# Patient Record
Sex: MALE | Race: WHITE | NOT HISPANIC OR LATINO | Employment: OTHER | URBAN - METROPOLITAN AREA
[De-identification: names, ages, dates, MRNs, and addresses within clinical notes are randomized per-mention and may not be internally consistent; named-entity substitution may affect disease eponyms.]

---

## 2023-01-31 PROBLEM — M10.9 GOUT: Status: ACTIVE | Noted: 2023-01-31

## 2023-01-31 PROBLEM — S76.812A STRAIN OF LEFT ILIOPSOAS MUSCLE: Status: ACTIVE | Noted: 2023-01-31

## 2023-01-31 PROBLEM — I10 HYPERTENSION: Status: ACTIVE | Noted: 2023-01-31

## 2023-01-31 PROBLEM — R50.9 SUBJECTIVE FEVER: Status: RESOLVED | Noted: 2023-01-31 | Resolved: 2023-01-31

## 2023-01-31 PROBLEM — R19.7 DIARRHEA: Status: RESOLVED | Noted: 2023-01-31 | Resolved: 2023-01-31

## 2023-01-31 PROBLEM — N40.0 BPH (BENIGN PROSTATIC HYPERPLASIA): Status: ACTIVE | Noted: 2023-01-31

## 2023-01-31 PROBLEM — M25.552 PAIN OF LEFT HIP JOINT: Status: ACTIVE | Noted: 2023-01-31

## 2023-01-31 PROBLEM — R52 BODY ACHES: Status: RESOLVED | Noted: 2023-01-31 | Resolved: 2023-01-31

## 2023-01-31 PROBLEM — M54.9 BACK PAIN: Status: ACTIVE | Noted: 2023-01-31

## 2023-01-31 PROBLEM — R97.20 ELEVATED PSA: Status: ACTIVE | Noted: 2023-01-31

## 2023-01-31 PROBLEM — E78.5 DYSLIPIDEMIA: Status: ACTIVE | Noted: 2023-01-31

## 2023-01-31 PROBLEM — R09.81 SINUS CONGESTION: Status: RESOLVED | Noted: 2023-01-31 | Resolved: 2023-01-31

## 2023-01-31 RX ORDER — MULTIVITAMIN
1 TABLET ORAL DAILY
COMMUNITY

## 2023-01-31 RX ORDER — ALLOPURINOL 300 MG/1
1 TABLET ORAL EVERY OTHER DAY
COMMUNITY
End: 2023-04-03 | Stop reason: SDUPTHER

## 2023-01-31 RX ORDER — ACETAMINOPHEN 500 MG
2 TABLET ORAL DAILY
COMMUNITY
Start: 2022-04-01

## 2023-01-31 RX ORDER — MECLIZINE HYDROCHLORIDE 25 MG/1
25 TABLET ORAL EVERY 6 HOURS
COMMUNITY
Start: 2021-07-12

## 2023-01-31 RX ORDER — METOPROLOL TARTRATE 50 MG/1
50 TABLET ORAL DAILY
COMMUNITY
End: 2024-01-26 | Stop reason: WASHOUT

## 2023-01-31 RX ORDER — TAMSULOSIN HYDROCHLORIDE 0.4 MG/1
0.4 CAPSULE ORAL DAILY
COMMUNITY
Start: 2021-03-11 | End: 2023-04-03 | Stop reason: SDUPTHER

## 2023-01-31 RX ORDER — ACETAMINOPHEN 500 MG
1000 TABLET ORAL
COMMUNITY
Start: 2022-04-01

## 2023-01-31 RX ORDER — L. ACIDOPHILUS/L.BULGARICUS 1MM CELL
1 TABLET ORAL DAILY
COMMUNITY
Start: 2021-11-19

## 2023-01-31 RX ORDER — METHYLDOPA/HYDROCHLOROTHIAZIDE 250MG-15MG
1 TABLET ORAL DAILY
COMMUNITY
Start: 2022-04-01

## 2023-03-23 ENCOUNTER — APPOINTMENT (OUTPATIENT)
Dept: PRIMARY CARE | Facility: CLINIC | Age: 78
End: 2023-03-23
Payer: MEDICARE

## 2023-03-29 LAB
ALANINE AMINOTRANSFERASE (SGPT) (U/L) IN SER/PLAS: 31 U/L (ref 10–52)
ALBUMIN (G/DL) IN SER/PLAS: 4.6 G/DL (ref 3.4–5)
ALKALINE PHOSPHATASE (U/L) IN SER/PLAS: 55 U/L (ref 33–136)
ANION GAP IN SER/PLAS: 10 MMOL/L (ref 10–20)
ASPARTATE AMINOTRANSFERASE (SGOT) (U/L) IN SER/PLAS: 21 U/L (ref 9–39)
BILIRUBIN TOTAL (MG/DL) IN SER/PLAS: 1 MG/DL (ref 0–1.2)
CALCIUM (MG/DL) IN SER/PLAS: 9.9 MG/DL (ref 8.6–10.3)
CARBON DIOXIDE, TOTAL (MMOL/L) IN SER/PLAS: 29 MMOL/L (ref 21–32)
CHLORIDE (MMOL/L) IN SER/PLAS: 105 MMOL/L (ref 98–107)
CHOLESTEROL (MG/DL) IN SER/PLAS: 90 MG/DL (ref 0–199)
CHOLESTEROL IN HDL (MG/DL) IN SER/PLAS: 39.7 MG/DL
CHOLESTEROL/HDL RATIO: 2.3
CREATININE (MG/DL) IN SER/PLAS: 0.95 MG/DL (ref 0.5–1.3)
GFR MALE: 81 ML/MIN/1.73M2
GLUCOSE (MG/DL) IN SER/PLAS: 87 MG/DL (ref 74–99)
LDL: 31 MG/DL (ref 0–99)
POTASSIUM (MMOL/L) IN SER/PLAS: 4.1 MMOL/L (ref 3.5–5.3)
PROTEIN TOTAL: 7.5 G/DL (ref 6.4–8.2)
SODIUM (MMOL/L) IN SER/PLAS: 140 MMOL/L (ref 136–145)
TRIGLYCERIDE (MG/DL) IN SER/PLAS: 98 MG/DL (ref 0–149)
URATE (MG/DL) IN SER/PLAS: 6 MG/DL (ref 4–7.5)
UREA NITROGEN (MG/DL) IN SER/PLAS: 18 MG/DL (ref 6–23)
VLDL: 20 MG/DL (ref 0–40)

## 2023-04-03 ENCOUNTER — OFFICE VISIT (OUTPATIENT)
Dept: PRIMARY CARE | Facility: CLINIC | Age: 78
End: 2023-04-03
Payer: MEDICARE

## 2023-04-03 VITALS
DIASTOLIC BLOOD PRESSURE: 70 MMHG | WEIGHT: 169 LBS | HEIGHT: 68 IN | BODY MASS INDEX: 25.61 KG/M2 | HEART RATE: 55 BPM | SYSTOLIC BLOOD PRESSURE: 130 MMHG | RESPIRATION RATE: 16 BRPM | OXYGEN SATURATION: 95 %

## 2023-04-03 DIAGNOSIS — Z00.00 ROUTINE GENERAL MEDICAL EXAMINATION AT HEALTH CARE FACILITY: Primary | ICD-10-CM

## 2023-04-03 DIAGNOSIS — N40.0 BENIGN PROSTATIC HYPERPLASIA WITHOUT LOWER URINARY TRACT SYMPTOMS: ICD-10-CM

## 2023-04-03 DIAGNOSIS — I10 PRIMARY HYPERTENSION: ICD-10-CM

## 2023-04-03 DIAGNOSIS — M1A.9XX0 CHRONIC GOUT WITHOUT TOPHUS, UNSPECIFIED CAUSE, UNSPECIFIED SITE: ICD-10-CM

## 2023-04-03 DIAGNOSIS — E78.5 DYSLIPIDEMIA: ICD-10-CM

## 2023-04-03 PROCEDURE — G0438 PPPS, INITIAL VISIT: HCPCS | Performed by: STUDENT IN AN ORGANIZED HEALTH CARE EDUCATION/TRAINING PROGRAM

## 2023-04-03 PROCEDURE — 1159F MED LIST DOCD IN RCRD: CPT | Performed by: STUDENT IN AN ORGANIZED HEALTH CARE EDUCATION/TRAINING PROGRAM

## 2023-04-03 PROCEDURE — 1160F RVW MEDS BY RX/DR IN RCRD: CPT | Performed by: STUDENT IN AN ORGANIZED HEALTH CARE EDUCATION/TRAINING PROGRAM

## 2023-04-03 PROCEDURE — 3078F DIAST BP <80 MM HG: CPT | Performed by: STUDENT IN AN ORGANIZED HEALTH CARE EDUCATION/TRAINING PROGRAM

## 2023-04-03 PROCEDURE — 1170F FXNL STATUS ASSESSED: CPT | Performed by: STUDENT IN AN ORGANIZED HEALTH CARE EDUCATION/TRAINING PROGRAM

## 2023-04-03 PROCEDURE — 3075F SYST BP GE 130 - 139MM HG: CPT | Performed by: STUDENT IN AN ORGANIZED HEALTH CARE EDUCATION/TRAINING PROGRAM

## 2023-04-03 PROCEDURE — 1036F TOBACCO NON-USER: CPT | Performed by: STUDENT IN AN ORGANIZED HEALTH CARE EDUCATION/TRAINING PROGRAM

## 2023-04-03 PROCEDURE — 1157F ADVNC CARE PLAN IN RCRD: CPT | Performed by: STUDENT IN AN ORGANIZED HEALTH CARE EDUCATION/TRAINING PROGRAM

## 2023-04-03 RX ORDER — ALLOPURINOL 300 MG/1
300 TABLET ORAL EVERY OTHER DAY
Qty: 45 TABLET | Refills: 1 | Status: SHIPPED | OUTPATIENT
Start: 2023-04-03 | End: 2023-09-30

## 2023-04-03 RX ORDER — TAMSULOSIN HYDROCHLORIDE 0.4 MG/1
0.4 CAPSULE ORAL DAILY
Qty: 90 CAPSULE | Refills: 0 | Status: SHIPPED | OUTPATIENT
Start: 2023-04-03 | End: 2023-06-19

## 2023-04-03 ASSESSMENT — ENCOUNTER SYMPTOMS
LOSS OF SENSATION IN FEET: 0
FEVER: 0
POLYPHAGIA: 0
HEMATURIA: 0
BLOOD IN STOOL: 0
NERVOUS/ANXIOUS: 0
NAUSEA: 0
CONFUSION: 0
CHILLS: 0
FATIGUE: 0
WEAKNESS: 0
CONSTIPATION: 0
WHEEZING: 0
DEPRESSION: 0
SHORTNESS OF BREATH: 0
ABDOMINAL PAIN: 0
EYE DISCHARGE: 0
ABDOMINAL DISTENTION: 0
SLEEP DISTURBANCE: 0
OCCASIONAL FEELINGS OF UNSTEADINESS: 0
ACTIVITY CHANGE: 0
SINUS PAIN: 0
TROUBLE SWALLOWING: 0
DIZZINESS: 0
HEADACHES: 0
COUGH: 0
WOUND: 0
POLYDIPSIA: 0

## 2023-04-03 ASSESSMENT — ACTIVITIES OF DAILY LIVING (ADL)
TAKING_MEDICATION: INDEPENDENT
BATHING: INDEPENDENT
MANAGING_FINANCES: INDEPENDENT
GROCERY_SHOPPING: INDEPENDENT
DOING_HOUSEWORK: INDEPENDENT
DRESSING: INDEPENDENT

## 2023-04-03 ASSESSMENT — PATIENT HEALTH QUESTIONNAIRE - PHQ9
1. LITTLE INTEREST OR PLEASURE IN DOING THINGS: NOT AT ALL
2. FEELING DOWN, DEPRESSED OR HOPELESS: NOT AT ALL
SUM OF ALL RESPONSES TO PHQ9 QUESTIONS 1 AND 2: 0

## 2023-04-03 NOTE — ASSESSMENT & PLAN NOTE
Stable and well controlled.  I recommend DASH diet, reduced salt in diet/cooking. Continue to monitor liver and kidney function. Continue current medications

## 2023-04-03 NOTE — PROGRESS NOTES
"Subjective   Reason for Visit: Jose Antonio Rubio is an 78 y.o. male here for a Medicare Wellness visit.     Past Medical, Surgical, and Family History reviewed and updated in chart.    Reviewed all medications by prescribing practitioner or clinical pharmacist (such as prescriptions, OTCs, herbal therapies and supplements) and documented in the medical record.    HPI  78-year-old male with past medical history of hyperlipidemia on dietary supplement, gout on allopurinol hypertension on metoprolol titrate, BPH on tamsulosin presents to the clinic for medicare wellness.    Patient Care Team:  Jose Pack DO as PCP - General (Internal Medicine)     Review of Systems   Constitutional:  Negative for activity change, chills, fatigue and fever.   HENT:  Negative for congestion, ear discharge, sinus pain and trouble swallowing.    Eyes:  Negative for discharge and visual disturbance.   Respiratory:  Negative for cough, shortness of breath and wheezing.    Cardiovascular:  Negative for chest pain and leg swelling.   Gastrointestinal:  Negative for abdominal distention, abdominal pain, blood in stool, constipation and nausea.   Endocrine: Negative for polydipsia and polyphagia.   Genitourinary:  Negative for hematuria.   Musculoskeletal:  Negative for gait problem.   Skin:  Negative for wound.   Allergic/Immunologic: Negative for food allergies.   Neurological:  Negative for dizziness, weakness and headaches.   Psychiatric/Behavioral:  Negative for confusion, sleep disturbance and suicidal ideas. The patient is not nervous/anxious.        Objective   Vitals:  /70 (BP Location: Right arm, Patient Position: Sitting, BP Cuff Size: Adult)   Pulse 55   Resp 16   Ht 1.727 m (5' 8\")   Wt 76.7 kg (169 lb)   SpO2 95%   BMI 25.70 kg/m²       Physical Exam  Vitals and nursing note reviewed.   Constitutional:       Appearance: Normal appearance. He is normal weight.   HENT:      Head: Normocephalic and atraumatic.      Right " Ear: Tympanic membrane normal.      Left Ear: Tympanic membrane normal.      Mouth/Throat:      Mouth: Mucous membranes are dry.   Eyes:      Extraocular Movements: Extraocular movements intact.      Conjunctiva/sclera: Conjunctivae normal.   Cardiovascular:      Rate and Rhythm: Normal rate.      Pulses: Normal pulses.   Pulmonary:      Effort: Pulmonary effort is normal. No respiratory distress.      Breath sounds: Normal breath sounds.   Abdominal:      General: Bowel sounds are normal. There is no distension.      Palpations: Abdomen is soft. There is no mass.   Musculoskeletal:         General: Normal range of motion.      Cervical back: Normal range of motion and neck supple.   Skin:     General: Skin is warm and dry.   Neurological:      General: No focal deficit present.      Mental Status: He is alert. Mental status is at baseline.   Psychiatric:         Mood and Affect: Mood normal.         Assessment/Plan   Problem List Items Addressed This Visit       BPH (benign prostatic hyperplasia)    Relevant Medications    tamsulosin (Flomax) 0.4 mg 24 hr capsule    Dyslipidemia    Current Assessment & Plan     He has done well with modifying his diet. His lipid profile significantly improved         Relevant Orders    Follow Up In [facility] - PCP    Gout    Relevant Medications    allopurinol (Zyloprim) 300 mg tablet    Hypertension    Current Assessment & Plan     Stable and well controlled.  I recommend DASH diet, reduced salt in diet/cooking. Continue to monitor liver and kidney function. Continue current medications          Relevant Orders    Follow Up In [facility] - PCP    Routine general medical examination at health care facility - Primary    Relevant Orders    Follow Up In [facility] - PCP

## 2023-04-03 NOTE — ASSESSMENT & PLAN NOTE
Overall, he is doing well.  He has cut out meat and is following low-salt diet.  He is up-to-date with COVID vaccination, flu vaccination and pneumonia vaccine.

## 2023-04-03 NOTE — PATIENT INSTRUCTIONS
It was nice meeting you today.     I encourage  you to eat low carb, low fat and high fiber diet    I encourage you to be active 5 -10 min each session as tolerated 3-5 times per week.      I encourage you to look up DASH diet and follow it.      If you need anything or have any questions, please call the clinic at [phone]     Follow up as scheduled

## 2023-05-15 LAB — PROSTATE SPECIFIC AG (NG/ML) IN SER/PLAS: 10.64 NG/ML (ref 0–4)

## 2023-06-16 DIAGNOSIS — N40.0 BENIGN PROSTATIC HYPERPLASIA WITHOUT LOWER URINARY TRACT SYMPTOMS: ICD-10-CM

## 2023-06-19 RX ORDER — TAMSULOSIN HYDROCHLORIDE 0.4 MG/1
CAPSULE ORAL
Qty: 90 CAPSULE | Refills: 3 | Status: SHIPPED | OUTPATIENT
Start: 2023-06-19

## 2023-06-23 LAB
ALANINE AMINOTRANSFERASE (SGPT) (U/L) IN SER/PLAS: 28 U/L (ref 10–52)
ALBUMIN (G/DL) IN SER/PLAS: 4.4 G/DL (ref 3.4–5)
ALKALINE PHOSPHATASE (U/L) IN SER/PLAS: 52 U/L (ref 33–136)
ANION GAP IN SER/PLAS: 10 MMOL/L (ref 10–20)
ASPARTATE AMINOTRANSFERASE (SGOT) (U/L) IN SER/PLAS: 24 U/L (ref 9–39)
BILIRUBIN TOTAL (MG/DL) IN SER/PLAS: 0.6 MG/DL (ref 0–1.2)
CALCIUM (MG/DL) IN SER/PLAS: 9.4 MG/DL (ref 8.6–10.3)
CARBON DIOXIDE, TOTAL (MMOL/L) IN SER/PLAS: 27 MMOL/L (ref 21–32)
CHLORIDE (MMOL/L) IN SER/PLAS: 107 MMOL/L (ref 98–107)
CHOLESTEROL (MG/DL) IN SER/PLAS: 81 MG/DL (ref 0–199)
CHOLESTEROL IN HDL (MG/DL) IN SER/PLAS: 35.6 MG/DL
CHOLESTEROL/HDL RATIO: 2.3
CREATININE (MG/DL) IN SER/PLAS: 0.91 MG/DL (ref 0.5–1.3)
ERYTHROCYTE DISTRIBUTION WIDTH (RATIO) BY AUTOMATED COUNT: 13.2 % (ref 11.5–14.5)
ERYTHROCYTE MEAN CORPUSCULAR HEMOGLOBIN CONCENTRATION (G/DL) BY AUTOMATED: 33 G/DL (ref 32–36)
ERYTHROCYTE MEAN CORPUSCULAR VOLUME (FL) BY AUTOMATED COUNT: 96 FL (ref 80–100)
ERYTHROCYTES (10*6/UL) IN BLOOD BY AUTOMATED COUNT: 4.73 X10E12/L (ref 4.5–5.9)
GFR MALE: 85 ML/MIN/1.73M2
GLUCOSE (MG/DL) IN SER/PLAS: 87 MG/DL (ref 74–99)
HEMATOCRIT (%) IN BLOOD BY AUTOMATED COUNT: 45.2 % (ref 41–52)
HEMOGLOBIN (G/DL) IN BLOOD: 14.9 G/DL (ref 13.5–17.5)
LDL: 32 MG/DL (ref 0–99)
LEUKOCYTES (10*3/UL) IN BLOOD BY AUTOMATED COUNT: 5.2 X10E9/L (ref 4.4–11.3)
MAGNESIUM (MG/DL) IN SER/PLAS: 1.98 MG/DL (ref 1.6–2.4)
PLATELETS (10*3/UL) IN BLOOD AUTOMATED COUNT: 196 X10E9/L (ref 150–450)
POTASSIUM (MMOL/L) IN SER/PLAS: 4.2 MMOL/L (ref 3.5–5.3)
PROTEIN TOTAL: 6.8 G/DL (ref 6.4–8.2)
SODIUM (MMOL/L) IN SER/PLAS: 140 MMOL/L (ref 136–145)
TRIGLYCERIDE (MG/DL) IN SER/PLAS: 69 MG/DL (ref 0–149)
UREA NITROGEN (MG/DL) IN SER/PLAS: 22 MG/DL (ref 6–23)
VLDL: 14 MG/DL (ref 0–40)

## 2023-08-30 DIAGNOSIS — R73.9 HYPERGLYCEMIA: ICD-10-CM

## 2023-08-30 DIAGNOSIS — Z13.29 SCREENING FOR THYROID DISORDER: ICD-10-CM

## 2023-08-30 DIAGNOSIS — Z76.89 ENCOUNTER TO ESTABLISH CARE WITH NEW DOCTOR: ICD-10-CM

## 2023-08-30 DIAGNOSIS — Z13.220 LIPID SCREENING: ICD-10-CM

## 2023-08-30 DIAGNOSIS — E55.9 VITAMIN D DEFICIENCY: ICD-10-CM

## 2023-08-30 DIAGNOSIS — Z13.21 ENCOUNTER FOR VITAMIN DEFICIENCY SCREENING: ICD-10-CM

## 2023-09-30 ENCOUNTER — LAB (OUTPATIENT)
Dept: LAB | Facility: LAB | Age: 78
End: 2023-09-30
Payer: MEDICARE

## 2023-09-30 DIAGNOSIS — Z13.29 SCREENING FOR THYROID DISORDER: ICD-10-CM

## 2023-09-30 DIAGNOSIS — Z13.220 LIPID SCREENING: ICD-10-CM

## 2023-09-30 DIAGNOSIS — Z13.21 ENCOUNTER FOR VITAMIN DEFICIENCY SCREENING: ICD-10-CM

## 2023-09-30 DIAGNOSIS — Z76.89 ENCOUNTER TO ESTABLISH CARE WITH NEW DOCTOR: ICD-10-CM

## 2023-09-30 DIAGNOSIS — E55.9 VITAMIN D DEFICIENCY: ICD-10-CM

## 2023-09-30 DIAGNOSIS — R73.9 HYPERGLYCEMIA: ICD-10-CM

## 2023-09-30 LAB
25(OH)D3 SERPL-MCNC: 74 NG/ML (ref 30–100)
ALBUMIN SERPL BCP-MCNC: 4.4 G/DL (ref 3.4–5)
ALP SERPL-CCNC: 55 U/L (ref 33–136)
ALT SERPL W P-5'-P-CCNC: 25 U/L (ref 10–52)
ANION GAP SERPL CALC-SCNC: 11 MMOL/L (ref 10–20)
AST SERPL W P-5'-P-CCNC: 20 U/L (ref 9–39)
BILIRUB SERPL-MCNC: 0.9 MG/DL (ref 0–1.2)
BUN SERPL-MCNC: 22 MG/DL (ref 6–23)
CALCIUM SERPL-MCNC: 9.5 MG/DL (ref 8.6–10.3)
CHLORIDE SERPL-SCNC: 107 MMOL/L (ref 98–107)
CHOLEST SERPL-MCNC: 93 MG/DL (ref 0–199)
CHOLESTEROL/HDL RATIO: 2.2
CO2 SERPL-SCNC: 28 MMOL/L (ref 21–32)
CREAT SERPL-MCNC: 0.89 MG/DL (ref 0.5–1.3)
ERYTHROCYTE [DISTWIDTH] IN BLOOD BY AUTOMATED COUNT: 13.3 % (ref 11.5–14.5)
GFR SERPL CREATININE-BSD FRML MDRD: 87 ML/MIN/1.73M*2
GLUCOSE SERPL-MCNC: 82 MG/DL (ref 74–99)
HCT VFR BLD AUTO: 46.9 % (ref 41–52)
HDLC SERPL-MCNC: 42.8 MG/DL
HGB BLD-MCNC: 15.5 G/DL (ref 13.5–17.5)
LDLC SERPL CALC-MCNC: 35 MG/DL (ref 140–190)
MCH RBC QN AUTO: 32 PG (ref 26–34)
MCHC RBC AUTO-ENTMCNC: 33 G/DL (ref 32–36)
MCV RBC AUTO: 97 FL (ref 80–100)
NON HDL CHOLESTEROL: 50 MG/DL (ref 0–149)
NRBC BLD-RTO: 0 /100 WBCS (ref 0–0)
PLATELET # BLD AUTO: 217 X10*3/UL (ref 150–450)
PMV BLD AUTO: 10.6 FL (ref 7.5–11.5)
POTASSIUM SERPL-SCNC: 4.7 MMOL/L (ref 3.5–5.3)
PROT SERPL-MCNC: 6.9 G/DL (ref 6.4–8.2)
RBC # BLD AUTO: 4.84 X10*6/UL (ref 4.5–5.9)
SODIUM SERPL-SCNC: 141 MMOL/L (ref 136–145)
TRIGL SERPL-MCNC: 74 MG/DL (ref 0–149)
TSH SERPL-ACNC: 2.5 MIU/L (ref 0.44–3.98)
VIT B12 SERPL-MCNC: 2854 PG/ML (ref 211–911)
VLDL: 15 MG/DL (ref 0–40)
WBC # BLD AUTO: 6.7 X10*3/UL (ref 4.4–11.3)

## 2023-09-30 PROCEDURE — 36415 COLL VENOUS BLD VENIPUNCTURE: CPT

## 2023-10-01 LAB
EST. AVERAGE GLUCOSE BLD GHB EST-MCNC: 105 MG/DL
HBA1C MFR BLD: 5.3 %

## 2023-10-03 ENCOUNTER — OFFICE VISIT (OUTPATIENT)
Dept: PRIMARY CARE | Facility: CLINIC | Age: 78
End: 2023-10-03
Payer: MEDICARE

## 2023-10-03 VITALS
HEIGHT: 68 IN | SYSTOLIC BLOOD PRESSURE: 140 MMHG | BODY MASS INDEX: 25.01 KG/M2 | HEART RATE: 69 BPM | DIASTOLIC BLOOD PRESSURE: 68 MMHG | OXYGEN SATURATION: 98 % | WEIGHT: 165 LBS

## 2023-10-03 DIAGNOSIS — Z23 NEED FOR VACCINATION: ICD-10-CM

## 2023-10-03 DIAGNOSIS — R74.8 ELEVATED VITAMIN B12 LEVEL: Primary | ICD-10-CM

## 2023-10-03 DIAGNOSIS — E78.5 DYSLIPIDEMIA: ICD-10-CM

## 2023-10-03 DIAGNOSIS — I10 PRIMARY HYPERTENSION: ICD-10-CM

## 2023-10-03 PROBLEM — R79.89 ELEVATED VITAMIN B12 LEVEL: Status: ACTIVE | Noted: 2023-10-03

## 2023-10-03 PROCEDURE — G0008 ADMIN INFLUENZA VIRUS VAC: HCPCS | Performed by: STUDENT IN AN ORGANIZED HEALTH CARE EDUCATION/TRAINING PROGRAM

## 2023-10-03 PROCEDURE — 99214 OFFICE O/P EST MOD 30 MIN: CPT | Performed by: STUDENT IN AN ORGANIZED HEALTH CARE EDUCATION/TRAINING PROGRAM

## 2023-10-03 PROCEDURE — 3077F SYST BP >= 140 MM HG: CPT | Performed by: STUDENT IN AN ORGANIZED HEALTH CARE EDUCATION/TRAINING PROGRAM

## 2023-10-03 PROCEDURE — 3078F DIAST BP <80 MM HG: CPT | Performed by: STUDENT IN AN ORGANIZED HEALTH CARE EDUCATION/TRAINING PROGRAM

## 2023-10-03 PROCEDURE — 1159F MED LIST DOCD IN RCRD: CPT | Performed by: STUDENT IN AN ORGANIZED HEALTH CARE EDUCATION/TRAINING PROGRAM

## 2023-10-03 PROCEDURE — 90662 IIV NO PRSV INCREASED AG IM: CPT | Performed by: STUDENT IN AN ORGANIZED HEALTH CARE EDUCATION/TRAINING PROGRAM

## 2023-10-03 PROCEDURE — 1160F RVW MEDS BY RX/DR IN RCRD: CPT | Performed by: STUDENT IN AN ORGANIZED HEALTH CARE EDUCATION/TRAINING PROGRAM

## 2023-10-03 PROCEDURE — 1036F TOBACCO NON-USER: CPT | Performed by: STUDENT IN AN ORGANIZED HEALTH CARE EDUCATION/TRAINING PROGRAM

## 2023-10-03 RX ORDER — ASPIRIN 81 MG/1
81 TABLET ORAL DAILY
COMMUNITY
Start: 2023-03-02

## 2023-10-03 RX ORDER — ATORVASTATIN CALCIUM 80 MG/1
80 TABLET, FILM COATED ORAL NIGHTLY
COMMUNITY
Start: 2023-03-02 | End: 2023-12-29 | Stop reason: SDUPTHER

## 2023-10-03 RX ORDER — CLOPIDOGREL BISULFATE 75 MG/1
75 TABLET ORAL DAILY
COMMUNITY
Start: 2023-03-02 | End: 2023-12-29 | Stop reason: SDUPTHER

## 2023-10-03 ASSESSMENT — ENCOUNTER SYMPTOMS
CHILLS: 0
SHORTNESS OF BREATH: 0
COUGH: 0
DEPRESSION: 0
TROUBLE SWALLOWING: 0
SLEEP DISTURBANCE: 0
CONFUSION: 0
WEAKNESS: 0
SINUS PAIN: 0
BLOOD IN STOOL: 0
NERVOUS/ANXIOUS: 0
HEADACHES: 0
LOSS OF SENSATION IN FEET: 0
EYE DISCHARGE: 0
FEVER: 0
WOUND: 0
DIZZINESS: 0
POLYPHAGIA: 0
ACTIVITY CHANGE: 0
FATIGUE: 0
OCCASIONAL FEELINGS OF UNSTEADINESS: 0
HEMATURIA: 0
NAUSEA: 0
CONSTIPATION: 0
ABDOMINAL PAIN: 0
POLYDIPSIA: 0
WHEEZING: 0
ABDOMINAL DISTENTION: 0

## 2023-10-03 NOTE — PATIENT INSTRUCTIONS
It was nice meeting you today.      I encourage  you to eat low carb, low fat and high fiber diet      I encourage you to be active 5 -10 min each session as tolerated 3-5 times per week.      I encourage you to look up DASH diet and follow it.      If you need anything or have any questions, please call the clinic at [phone]     Follow up as scheduled

## 2023-10-03 NOTE — PROGRESS NOTES
Subjective   Patient ID: Jose Antonio Rubio is a 78 y.o. male who presents for Follow-up.    HPI    Medical history of HTN, HLD, BPH, gout present to the clinic for follow up    He is doing well. We reviewed recent blood test. B12 is elevated. Advised to cut back on B12. Can take 500 mg     Review of Systems   Constitutional:  Negative for activity change, chills, fatigue and fever.   HENT:  Negative for congestion, ear discharge, sinus pain and trouble swallowing.    Eyes:  Negative for discharge and visual disturbance.   Respiratory:  Negative for cough, shortness of breath and wheezing.    Cardiovascular:  Negative for chest pain and leg swelling.   Gastrointestinal:  Negative for abdominal distention, abdominal pain, blood in stool, constipation and nausea.   Endocrine: Negative for polydipsia and polyphagia.   Genitourinary:  Negative for hematuria.   Musculoskeletal:  Negative for gait problem.   Skin:  Negative for wound.   Allergic/Immunologic: Negative for food allergies.   Neurological:  Negative for dizziness, weakness and headaches.   Psychiatric/Behavioral:  Negative for confusion, sleep disturbance and suicidal ideas. The patient is not nervous/anxious.        Objective     Physical Exam  Vitals and nursing note reviewed.   Constitutional:       Appearance: Normal appearance. He is normal weight.   HENT:      Head: Normocephalic and atraumatic.      Right Ear: External ear normal.      Left Ear: External ear normal.      Mouth/Throat:      Mouth: Mucous membranes are dry.   Eyes:      Extraocular Movements: Extraocular movements intact.      Conjunctiva/sclera: Conjunctivae normal.   Cardiovascular:      Rate and Rhythm: Normal rate.      Pulses: Normal pulses.   Pulmonary:      Effort: Pulmonary effort is normal. No respiratory distress.      Breath sounds: Normal breath sounds.   Abdominal:      General: Bowel sounds are normal. There is no distension.      Palpations: Abdomen is soft. There is no mass.    Musculoskeletal:         General: Normal range of motion.   Skin:     General: Skin is warm and dry.   Neurological:      General: No focal deficit present.      Mental Status: He is alert. Mental status is at baseline.   Psychiatric:         Mood and Affect: Mood normal.         Assessment/Plan   HTN. Stable and well controlled at 140/68. Continue current therapy. He has normal liver and kidney function.    Vitamin D. Doing well. Continue current supplement 5000 untit    B12 elevated. Please cut back on B12 level    HCM. He will receive flu vaccine today     Problem List Items Addressed This Visit       Dyslipidemia    Relevant Orders    Follow Up [facility] - PCP - Established    Hypertension    Relevant Orders    Follow Up [facility] - PCP - Established    Elevated vitamin B12 level - Primary     Other Visit Diagnoses       Need for vaccination        Relevant Orders    Flu vaccine, quadrivalent, high-dose, preservative free, age 65y+ (FLUZONE) (Completed)

## 2023-12-15 DIAGNOSIS — I10 PRIMARY HYPERTENSION: Primary | ICD-10-CM

## 2023-12-15 DIAGNOSIS — R73.9 HYPERGLYCEMIA: ICD-10-CM

## 2023-12-15 DIAGNOSIS — R79.89 LOW VITAMIN B12 LEVEL: ICD-10-CM

## 2023-12-21 RX ORDER — METOPROLOL SUCCINATE 25 MG/1
TABLET, EXTENDED RELEASE ORAL
COMMUNITY
Start: 2023-11-22 | End: 2024-01-26 | Stop reason: SDUPTHER

## 2023-12-22 ENCOUNTER — LAB (OUTPATIENT)
Dept: LAB | Facility: LAB | Age: 78
End: 2023-12-22
Payer: MEDICARE

## 2023-12-22 DIAGNOSIS — I10 ESSENTIAL (PRIMARY) HYPERTENSION: ICD-10-CM

## 2023-12-22 DIAGNOSIS — E78.5 HYPERLIPIDEMIA, UNSPECIFIED: ICD-10-CM

## 2023-12-22 DIAGNOSIS — I25.10 ATHEROSCLEROTIC HEART DISEASE OF NATIVE CORONARY ARTERY WITHOUT ANGINA PECTORIS: ICD-10-CM

## 2023-12-22 DIAGNOSIS — I10 ESSENTIAL (PRIMARY) HYPERTENSION: Primary | ICD-10-CM

## 2023-12-22 LAB
ANION GAP SERPL CALC-SCNC: 13 MMOL/L (ref 10–20)
BUN SERPL-MCNC: 21 MG/DL (ref 6–23)
CALCIUM SERPL-MCNC: 10 MG/DL (ref 8.6–10.3)
CHLORIDE SERPL-SCNC: 106 MMOL/L (ref 98–107)
CO2 SERPL-SCNC: 28 MMOL/L (ref 21–32)
CREAT SERPL-MCNC: 0.91 MG/DL (ref 0.5–1.3)
ERYTHROCYTE [DISTWIDTH] IN BLOOD BY AUTOMATED COUNT: 13.2 % (ref 11.5–14.5)
GFR SERPL CREATININE-BSD FRML MDRD: 85 ML/MIN/1.73M*2
GLUCOSE SERPL-MCNC: 99 MG/DL (ref 74–99)
HCT VFR BLD AUTO: 48.8 % (ref 41–52)
HGB BLD-MCNC: 15.9 G/DL (ref 13.5–17.5)
MAGNESIUM SERPL-MCNC: 2.2 MG/DL (ref 1.6–2.4)
MCH RBC QN AUTO: 31.3 PG (ref 26–34)
MCHC RBC AUTO-ENTMCNC: 32.6 G/DL (ref 32–36)
MCV RBC AUTO: 96 FL (ref 80–100)
NRBC BLD-RTO: 0 /100 WBCS (ref 0–0)
PLATELET # BLD AUTO: 234 X10*3/UL (ref 150–450)
POTASSIUM SERPL-SCNC: 4.5 MMOL/L (ref 3.5–5.3)
RBC # BLD AUTO: 5.08 X10*6/UL (ref 4.5–5.9)
SODIUM SERPL-SCNC: 142 MMOL/L (ref 136–145)
WBC # BLD AUTO: 7.5 X10*3/UL (ref 4.4–11.3)

## 2023-12-22 PROCEDURE — 85027 COMPLETE CBC AUTOMATED: CPT

## 2023-12-22 PROCEDURE — 36415 COLL VENOUS BLD VENIPUNCTURE: CPT

## 2023-12-22 PROCEDURE — 80048 BASIC METABOLIC PNL TOTAL CA: CPT

## 2023-12-22 PROCEDURE — 83735 ASSAY OF MAGNESIUM: CPT

## 2023-12-28 PROBLEM — I25.10 CAD (CORONARY ARTERY DISEASE): Status: ACTIVE | Noted: 2023-12-28

## 2023-12-28 NOTE — PROGRESS NOTES
[facility]    Patient Name: Jose Antonio Rubio  Patient : 1945      Scribe Attestation  By signing my name below, IVicky Scribe   attest that this documentation has been prepared under the direction and in the presence of Franco Luis DO.      Reason for visit:  This is an 78-year-old male here for follow-up regarding his history of coronary artery disease status post PCI of his OM done in 2023, hypertension, and dyslipidemia.        HPI:  This is an 78-year-old male here for follow-up regarding his history of coronary artery disease status post PCI of his OM done in 2023, hypertension, and dyslipidemia.  The patient was last evaluated by me in 2023.  At that visit I ordered blood work including BMP/CBC/magnesium be drawn in 6 months and asked the patient to follow-up in 6 months.  BMP done in 2023 showed normal serum sodium and potassium with a serum creatinine 0.91, serum magnesium was 2.20, CBC showing hemoglobin of 15.9.  Hemoglobin A1c done in 2023 was 5.3%, TSH of 2.50. Lipid panel done in 2023 showed an LDL cholesterol of 35 and triglycerides of 74 while on atorvastatin 80 mg daily. ECG done today showed sinus rhythm with a heart rate of 65 bpm.  The patient reports having myalgia but denies any new chest pain, shortness of breath, palpitations or lightheadedness. He states that he takes all of his medications as prescribed. During my exam, he was resting comfortably on the exam table.           Assessment/Plan:   1. Coronary artery disease  The patient has a history of coronary artery disease status post PCI of his OM done in 2023.  ECG done today showed sinus rhythm with a heart rate of 65 bpm.    He denies anginal chest discomfort.  Blood pressure appears controlled on exam today.  He should continue his current antihypertensive medications.  He should remain on clopidogrel and aspirin, then he  may discontinue clopidogrel in March 2024.  Echocardiogram done 2/28/2023 showed normal left ventricular systolic function with an ejection fraction of 55 to 60%, normal right ventricular systolic function, no significant valve abnormalities.  Recent lab works as noted in the HPI.  Lipid panel done in September 2023 showed an LDL cholesterol of 35 and triglycerides of 74 while on atorvastatin 80 mg daily. He reports myalgia on current dose of atorvastatin.  He should hold atorvastatin for 1 week then restart at 40 mg daily.  Please see lifestyle recommendations below.  Follow up in 1 year and sooner if necessary.      2. Hypertension  Patient has a history of hypertension which appears controlled on exam today.  He should continue his current antihypertensive medications.      3. Dyslipidemia/statin intolerance.  Lipid panel done in September 2023 showed an LDL cholesterol of 35 and triglycerides of 74 while on atorvastatin 80 mg daily. He reports myalgia on current dose of atorvastatin.  He should hold atorvastatin for 1 week then restart at 40 mg daily.  Please see lifestyle recommendations below.      4. Preoperative cardiovascular examination  The patient is being evaluated for dental implant. He does have a history of coronary artery disease status post PCI of his OM done in March 2023. He denies a history of  heart failure, ischemic cerebrovascular disease, insulin-dependent diabetes mellitus, or significant renal dysfunction.     RCRI of 1 based on history placing him at  low risk of perioperative MACE.    I do not believe any additional preoperative testing is necessary at this time. Patient should continue his current cardiac medications perioperatively.  Patient will need to remain on dual antiplatelet therapy for 1 year post intervention.  Overall, the patient appears to be low risk for perioperative MACE. The patient expressed understanding of his risk for perioperative cardiovascular  complications.      Orders:   Hold atorvastatin for 1 week then restart at 40 mg daily.  Follow-up in 1 year.    Lifestyle Recommendations  I recommend a whole-food plant-based diet, an eating pattern that encourages the consumption of unrefined plant foods (such as fruits, vegetables, tubers, whole grains, legumes, nuts and seeds) and discourages meats, dairy products, eggs and processed foods.     The AHA/ACC recommends that the patient consume a dietary pattern that emphasizes intake of vegetables, fruits, and whole grains; includes low-fat dairy products, poultry, fish, legumes, non-tropical vegetable oils, and nuts; and limits intake of sodium, sweets, sugar-sweetened beverages, and red meats.  Adapt this dietary pattern to appropriate calorie requirements (a 500-750 kcal/day deficit to loose weight), personal and cultural food preferences, and nutrition therapy for other medical conditions (including diabetes).  Achieve this pattern by following plans such as the [place] Mediterranean, DASH dietary pattern, or AHA diet.     Engage in 2 hours and 30 minutes per week of moderate-intensity physical activity, or 1 hour and 15 minutes (75 minutes) per week of vigorous-intensity aerobic physical activity, or an equivalent combination of moderate and vigorous-intensity aerobic physical activity. Aerobic activity should be performed in episodes of at least 10 minutes preferably spread throughout the week.     Adhering to a heart healthy diet, regular exercise habits, avoidance of tobacco products, and maintenance of a healthy weight are crucial components of their heart disease risk reduction.     Any positive review of systems not specifically addressed in the office visit today should be evaluated and treated by the patients primary care physician or in an emergency department if necessary     Patient was notified that results from ordered tests will be called to the patient if it changes current management; it will  otherwise be discussed at a future appointment and available on  [email or web address].     Thank you for allowing me to participate in the care of this patient.        This document was generated using the assistance of voice recognition software. If there are any errors of spelling, grammar, syntax, or meaning; please feel free to contact me directly for clarification.    Past Medical History:  He has a past medical history of Arthropathy, unspecified, Hematospermia, Personal history of other diseases of male genital organs, Personal history of other specified conditions, Sinus congestion (01/31/2023), and Subjective fever (01/31/2023).    Past Surgical History:  He has a past surgical history that includes Hernia repair; Joint replacement; Hernia repair (05/18/2017); and Hip surgery (05/18/2017).      Social History:  He reports that he has never smoked. He has never used smokeless tobacco. He reports that he does not currently use alcohol. He reports that he does not use drugs.    Family History:  No family history on file.     Allergies:  Patient has no known allergies.    Outpatient Medications:  Current Outpatient Medications   Medication Instructions    acetaminophen (TYLENOL) 1,000 mg, oral, Daily RT    aspirin 81 mg, oral, Daily    atorvastatin (LIPITOR) 80 mg, oral, Nightly    cholecalciferol (Vitamin D-3) 5,000 Units tablet 2 tablets, oral, Daily    clopidogrel (PLAVIX) 75 mg, oral, Daily    fish oil concentrate (Omega-3) 120-180 mg capsule 1 g, oral, 2 times daily    Lactobacillus acidoph-L.bulgar 1 million cell tablet tablet 1 tablet, oral, Daily    magnesium oxide (Mag-Ox) 200 mg split tablet oral    meclizine (ANTIVERT) 25 mg, oral, Every 6 hours    metoprolol succinate XL (Toprol-XL) 25 mg 24 hr tablet     metoprolol tartrate (LOPRESSOR) 50 mg, oral, Daily    multivitamin tablet 1 tablet, oral, Daily    plant stanol cindy 450 mg tablet oral    tamsulosin (Flomax) 0.4 mg 24 hr capsule TAKE 1 CAPSULE BY MOUTH  "ONCE  DAILY    vitamin K2, MK-4, 100 mcg tablet 1 tablet, oral, Daily    zinc sulfate 66 mg tablet oral        ROS:  A 14 point review of systems was done and is negative other than as stated in HPI    Vitals:      3/2/2023    11:21 AM 3/9/2023     3:27 PM 4/3/2023     1:38 PM 6/30/2023     1:39 PM 7/14/2023     1:22 PM 9/8/2023    12:50 PM 10/3/2023     1:40 PM   Vitals   Systolic  121 130 150   140   Diastolic  80 70 82   68   Heart Rate  61 55 59   69   Temp     36.1 °C (96.9 °F) 36.6 °C (97.8 °F)    Resp   16       Height (in) 1.727 m (5' 7.99\") 1.727 m (5' 8\") 1.727 m (5' 8\") 1.727 m (5' 8\") 1.727 m (5' 8\") 1.695 m (5' 6.75\") 1.727 m (5' 8\")   Weight (lb)  172 169 167.25 165.7 163.4 165   BMI 26.82 kg/m2 26.15 kg/m2 25.7 kg/m2 25.43 kg/m2 25.19 kg/m2 25.78 kg/m2 25.09 kg/m2   BSA (m2) 1.96 m2 1.93 m2 1.92 m2 1.91 m2 1.9 m2 1.87 m2 1.89 m2        Physical Exam:   Constitutional: Cooperative, in no acute distress, alert, appears stated age.   Skin: Skin color, texture, turgor normal. No rashes or lesions.   Head: Normocephalic. No masses, lesions, tenderness or abnormalities   Eyes: Extraocular movements are grossly intact.   Mouth and throat: Mucous membranes moist   Neck: Neck supple, no carotid bruits, no JVD   Respiratory: Lungs clear to auscultation, no wheezing or rhonchi, no use of accessory muscles   Chest wall: No scars, normal excursion with respiration   Cardiovascular: Regular rhythm without murmur, gallop, or rubs   Gastrointestinal: Abdomen soft, nontender. Bowel sounds normal.   Musculoskeletal: Strength equal in upper extremities   Extremities: Trivial pitting edema   Neurologic: Sensation grossly intact, alert and oriented x3    Intake/Output:   No intake/output data recorded.    Outpatient Medications  Current Outpatient Medications on File Prior to Visit   Medication Sig Dispense Refill    acetaminophen (Tylenol) 500 mg tablet Take 2 tablets (1,000 mg) by mouth once daily.      aspirin 81 mg EC " tablet Take 1 tablet (81 mg) by mouth once daily.      atorvastatin (Lipitor) 80 mg tablet Take 1 tablet (80 mg) by mouth once daily at bedtime.      cholecalciferol (Vitamin D-3) 5,000 Units tablet Take 2 tablets (10,000 Units) by mouth once daily.      clopidogrel (Plavix) 75 mg tablet Take 1 tablet (75 mg) by mouth once daily.      fish oil concentrate (Omega-3) 120-180 mg capsule Take 1 capsule (1 g) by mouth twice a day.      Lactobacillus acidoph-L.bulgar 1 million cell tablet tablet Take 1 tablet by mouth once daily.      magnesium oxide (Mag-Ox) 200 mg split tablet Take by mouth.      meclizine (Antivert) 25 mg tablet Take 1 tablet (25 mg) by mouth every 6 hours.      metoprolol succinate XL (Toprol-XL) 25 mg 24 hr tablet       multivitamin tablet Take 1 tablet by mouth once daily.      plant stanol cindy 450 mg tablet Take by mouth.      tamsulosin (Flomax) 0.4 mg 24 hr capsule TAKE 1 CAPSULE BY MOUTH ONCE  DAILY 90 capsule 3    vitamin K2, MK-4, 100 mcg tablet Take 1 tablet by mouth in the morning.      zinc sulfate 66 mg tablet Take by mouth.      metoprolol tartrate (Lopressor) 50 mg tablet Take 1 tablet by mouth once daily.       No current facility-administered medications on file prior to visit.       Labs: (past 26 weeks)  Recent Results (from the past 4368 hour(s))   Comprehensive Metabolic Panel    Collection Time: 09/30/23 11:18 AM   Result Value Ref Range    Glucose 82 74 - 99 mg/dL    Sodium 141 136 - 145 mmol/L    Potassium 4.7 3.5 - 5.3 mmol/L    Chloride 107 98 - 107 mmol/L    Bicarbonate 28 21 - 32 mmol/L    Anion Gap 11 10 - 20 mmol/L    Urea Nitrogen 22 6 - 23 mg/dL    Creatinine 0.89 0.50 - 1.30 mg/dL    eGFR 87 >60 mL/min/1.73m*2    Calcium 9.5 8.6 - 10.3 mg/dL    Albumin 4.4 3.4 - 5.0 g/dL    Alkaline Phosphatase 55 33 - 136 U/L    Total Protein 6.9 6.4 - 8.2 g/dL    AST 20 9 - 39 U/L    Bilirubin, Total 0.9 0.0 - 1.2 mg/dL    ALT 25 10 - 52 U/L   TSH with reflex to Free T4 if abnormal     Collection Time: 09/30/23 11:18 AM   Result Value Ref Range    Thyroid Stimulating Hormone 2.50 0.44 - 3.98 mIU/L   Vitamin D 25-Hydroxy,Total (for eval of Vitamin D levels)    Collection Time: 09/30/23 11:18 AM   Result Value Ref Range    Vitamin D, 25-Hydroxy, Total 74 30 - 100 ng/mL   Lipid Panel    Collection Time: 09/30/23 11:18 AM   Result Value Ref Range    Cholesterol 93 0 - 199 mg/dL    HDL-Cholesterol 42.8 mg/dL    Cholesterol/HDL Ratio 2.2     LDL Calculated 35 (L) 140 - 190 mg/dL    VLDL 15 0 - 40 mg/dL    Triglycerides 74 0 - 149 mg/dL    Non HDL Cholesterol 50 0 - 149 mg/dL   Hemoglobin A1C    Collection Time: 09/30/23 11:18 AM   Result Value Ref Range    Hemoglobin A1C 5.3 see below %    Estimated Average Glucose 105 Not Established mg/dL   CBC    Collection Time: 09/30/23 11:18 AM   Result Value Ref Range    WBC 6.7 4.4 - 11.3 x10*3/uL    nRBC 0.0 0.0 - 0.0 /100 WBCs    RBC 4.84 4.50 - 5.90 x10*6/uL    Hemoglobin 15.5 13.5 - 17.5 g/dL    Hematocrit 46.9 41.0 - 52.0 %    MCV 97 80 - 100 fL    MCH 32.0 26.0 - 34.0 pg    MCHC 33.0 32.0 - 36.0 g/dL    RDW 13.3 11.5 - 14.5 %    Platelets 217 150 - 450 x10*3/uL    MPV 10.6 7.5 - 11.5 fL   Vitamin B12    Collection Time: 09/30/23 11:18 AM   Result Value Ref Range    Vitamin B12 2,854 (H) 211 - 911 pg/mL   Basic Metabolic Panel    Collection Time: 12/22/23 11:13 AM   Result Value Ref Range    Glucose 99 74 - 99 mg/dL    Sodium 142 136 - 145 mmol/L    Potassium 4.5 3.5 - 5.3 mmol/L    Chloride 106 98 - 107 mmol/L    Bicarbonate 28 21 - 32 mmol/L    Anion Gap 13 10 - 20 mmol/L    Urea Nitrogen 21 6 - 23 mg/dL    Creatinine 0.91 0.50 - 1.30 mg/dL    eGFR 85 >60 mL/min/1.73m*2    Calcium 10.0 8.6 - 10.3 mg/dL   CBC    Collection Time: 12/22/23 11:13 AM   Result Value Ref Range    WBC 7.5 4.4 - 11.3 x10*3/uL    nRBC 0.0 0.0 - 0.0 /100 WBCs    RBC 5.08 4.50 - 5.90 x10*6/uL    Hemoglobin 15.9 13.5 - 17.5 g/dL    Hematocrit 48.8 41.0 - 52.0 %    MCV 96 80 - 100  fL    MCH 31.3 26.0 - 34.0 pg    MCHC 32.6 32.0 - 36.0 g/dL    RDW 13.2 11.5 - 14.5 %    Platelets 234 150 - 450 x10*3/uL   Magnesium    Collection Time: 12/22/23 11:13 AM   Result Value Ref Range    Magnesium 2.20 1.60 - 2.40 mg/dL       ECG  No results found for this or any previous visit (from the past 4464 hour(s)).    Echocardiogram  No results found for this or any previous visit from the past 1095 days.      CV Studies:  EKG: No results found for this or any previous visit (from the past 4464 hour(s)).  Echocardiogram:   [place] [address]  Phone [phone] Fax [phone]    TRANSTHORACIC ECHOCARDIOGRAM REPORT      Patient Name:     RITA CHINCHILLA KENTRELLMARIELA      Reading Physician:   [id number] Franco Luis DO  Study Date:       2/28/2023           Referring Physician: REGINALDO PULIDO  MRN/PID:          [id number]            PCP:  Accession/Order#: [id number]           Department Location: [address]  YOB: 1945           Fellow:  Gender:           M                   Nurse:  Admit Date:       2/27/2023           Sonographer:         Jill Pulido RDCS  Admission Status: Inpatient - Routine Additional Staff:  Height:           172.72 cm           CC Report to:  Weight:           79.83 kg            Study Type:          Echocardiogram  BSA:              1.94 m2  Blood Pressure: 157 /65 mmHg    Diagnosis/ICD: R07.89-Other chest pain  Indication:    Chest Pain  Procedure/CPT: Echo Complete w Full [id number]  Study Detail: The following Echo studies were performed: 2D, M-Mode, Doppler and  color flow.      PHYSICIAN INTERPRETATION:  Left Ventricle: Left ventricular systolic function is normal, with an estimated ejection fraction of 55-60%. There are no regional wall motion abnormalities. The left ventricular cavity size is normal. Spectral Doppler shows a normal pattern of left ventricular diastolic filling.  Left Atrium: The left  atrium is normal in size.  Right Ventricle: The right ventricle is normal in size. There is normal right ventricular global systolic function.  Right Atrium: The right atrium is normal in size.  Aortic Valve: The aortic valve is trileaflet. There is trivial aortic valve regurgitation. The peak instantaneous gradient of the aortic valve is 10.9 mmHg. The mean gradient of the aortic valve is 5.0 mmHg.  Mitral Valve: The mitral valve is normal in structure. There is trace mitral valve regurgitation.  Tricuspid Valve: The tricuspid valve is structurally normal. There is trace tricuspid regurgitation.  Pulmonic Valve: The pulmonic valve is structurally normal. There is physiologic pulmonic valve regurgitation.  Pericardium: There is no pericardial effusion noted.  Aorta: The aortic root is normal.      CONCLUSIONS:  1. Left ventricular systolic function is normal with a 55-60% estimated ejection fraction.    QUANTITATIVE DATA SUMMARY:  2D MEASUREMENTS:  Normal Ranges:  Ao Root d:     2.80 cm   (2.0-3.7cm)  LAs:           4.30 cm   (2.7-4.0cm)  IVSd:          1.15 cm   (0.6-1.1cm)  LVPWd:         1.10 cm   (0.6-1.1cm)  LVIDd:         4.47 cm   (3.9-5.9cm)  LVIDs:         2.31 cm  LV Mass Index: 92.3 g/m2  LV % FS        48.3 %    LA VOLUME:  Normal Ranges:  LA Vol A4C:        32.1 ml    (22+/-6mL/m2)  LA Vol A2C:        34.0 ml  LA Vol BP:         34.4 ml  LA Vol Index A4C:  16.6ml/m2  LA Vol Index A2C:  17.6 ml/m2  LA Vol Index BP:   17.8 ml/m2  LA Area A4C:       13.6 cm2  LA Area A2C:       14.6 cm2  LA Major Axis A4C: 4.9 cm  LA Major Axis A2C: 5.3 cm  LA Volume Index:   16.6 ml/m2    RA VOLUME BY A/L METHOD:  Normal Ranges:  RA Area A4C: 7.9 cm2    M-MODE MEASUREMENTS:  Normal Ranges:  Ao Root: 2.80 cm (2.0-3.7cm)    AORTA MEASUREMENTS:  Normal Ranges:  Asc Ao, d: 3.00 cm (2.1-3.4cm)    LV SYSTOLIC FUNCTION BY 2D PLANIMETRY (MOD):  Normal Ranges:  EF-A4C View: 58.7 % (>=55%)  EF-A2C View: 55.5 %  EF-Biplane:  55.7  %    LV DIASTOLIC FUNCTION:  Normal Ranges:  MV Peak E:    0.53 m/s (0.7-1.2 m/s)  MV Peak A:    1.05 m/s (0.42-0.7 m/s)  E/A Ratio:    0.50     (1.0-2.2)  MV e'         0.10 m/s (>8.0)  MV lateral e' 0.12 m/s  MV medial e'  0.07 m/s  E/e' Ratio:   5.55     (<8.0)    MITRAL VALVE:  Normal Ranges:  MV DT: 411 msec (150-240msec)    AORTIC VALVE:  Normal Ranges:  AoV Vmax:                1.65 m/s  (<=1.7m/s)  AoV Peak PG:             10.9 mmHg (<20mmHg)  AoV Mean P.0 mmHg  (1.7-11.5mmHg)  LVOT Max Kyler:            0.95 m/s  (<=1.1m/s)  AoV VTI:                 31.30 cm  (18-25cm)  LVOT VTI:                19.10 cm  LVOT Diameter:           2.00 cm   (1.8-2.4cm)  AoV Area, VTI:           1.92 cm2  (2.5-5.5cm2)  AoV Area,Vmax:           1.81 cm2  (2.5-4.5cm2)  AoV Dimensionless Index: 0.61    RIGHT VENTRICLE:  RV 1   2.83 cm  RV 2   2.38 cm  RV 3   5.97 cm  TAPSE: 21.9 mm  RV s'  0.16 m/s    TRICUSPID VALVE/RVSP:  Normal Ranges:  TV E Vmax: 0.32 m/s (0.3-0.7m/s)  TV A Vmax: 0.33 m/s  IVC Diam:  1.87 cm      [id number] Mary Luis DO  Electronically signed on 2023 at 12:11:47 PM         Final     Stress Testing IMESULT([id number]): No results found for this or any previous visit from the past  days.    Cardiac Catheterization:   Adult Cath     [facility], Cath Lab  [address] [place], [place] [id number]  Phone [phone] Fax [phone]    Cardiovascular Catheterization Report    Patient Name:     RITA HUBER Performing Physician: [id number] Aric Maldonado MD  Study Date:       3/1/2023       Verifying Physician:  [id number] Aric Maldonado MD  MRN/PID:          [id number]       Cardiologist:  Accession/Order#: [id number]      Referring Physician:  MARY LUIS  YOB: 1945      Referring Physician:  Gender:           M              Referring Physician:      Study: Left Heart Catheterization      Indications:  RITA HUBER is a 78 year old male who presents with  hypertension and dyslipidemia. New onset angina <=2 months, new onset angina <= 2 Months and other PCI indication - unstable angina , with a chest pain assessment of typical angina. Study performed as an urgent cath procedure.    Medical History:  Stress test performed: No. CTA performed: No. Agatston accessed: No. LVEF Assessed: Yes.    Procedure Description:  After infiltration with 2% Lidocaine, the right radial artery was cannulated with a modified Seldinger technique. Subsequently a 6 Maltese sheath was placed in the right radial artery. After infiltration with 2% Lidocaine, a second arterial access was obtained via the right femoral artery with a modified Seldinger technique and a 6 Maltese sheath was placed. This second arterial access was obtained to allow for subclavian artery tortuosity. Selective coronary catheterization was performed using a 6 Fr catheter(s) exchanged over a guide wire to cannulate the coronary arteries. A JL 4 tip catheter was used for left coronary injections. A JR 4 tip catheter was used for right coronary injections.  Multiple injections of contrast were made into the left and right coronary arteries with angiograms recorded in multiple projections. After completion of the procedure, the arterial sheath was pulled and a TR Band Radial Compression Device was utilized to obtain patent hemostasis. Femoral artery angiography was performed at the additional arterial access site. This demonstrated a common femoral artery puncture appropriate for closure. An Angio-Seal Evolution 6F ([employer]) vascular closure device was placed per protocol.    Coronary Angiography:  The coronary circulation is co-dominant.    Left Main Coronary Artery:  The left main coronary artery is a normal caliber vessel. The left main arises normally from the left coronary sinus of Valsalva and bifurcates into the LAD and circumflex coronary arteries. The left main coronary artery showed no significant disease  or stenosis greater than 30%.    Left Anterior Descending Coronary Artery Distribution:  The left anterior descending coronary artery is a normal caliber vessel. The LAD arises normally from the left main coronary artery and wraps around the apex of the LV. The LAD demonstrated diffuse mild atherosclerotic disease. The mid left anterior descending coronary artery showed ~ 30-40%. This lesion was mildly calcified. An additional lesion, located at the mid left anterior descending coronary artery, revealed 30% stenosis. This second lesion was calcified. The 1st diagonal branch is a normal caliber vessel. The 1st diagonal branch showed mild atherosclerotic disease.    Circumflex Coronary Artery Distribution:  The circumflex coronary artery is a large caliber vessel. The circumflex arises normally from the left main coronary artery, giving rise to the first obtuse marginal and supplies the left posterolateral descending artery segment. The circumflex revealed mild atherosclerotic disease. The 1st obtuse marginal branch is a large and branching vessel caliber vessel. The mid 1st obtuse marginal branch showed 95-99%. The left posterior descending artery is a normal caliber vessel. The left posterior descending artery showed mild atherosclerotic disease.    Right Coronary Artery Distribution:    The right coronary artery is a medium-sized caliber vessel. The RCA arises normally from the right sinus of Valsalva and supplies the right posterolateral descending artery. The RCA showed diffuse mild atherosclerotic disease. The right posterior descending artery is a medium-sized caliber vessel. The right posterior descending artery showed mild atherosclerotic disease.    Coronary Interventions:  Angiography reveals a 99% stenosis of the mid 1st obtuse marginal and lower branch of OM1. Pre-intervention MASHA flow was 3. Percutaneous coronary intervention was performed within the mid 1st obtuse marginal and lower branch of OM1. The  vessel was pre-dilated using a compliant balloon 2.0 mm x 15 mm at 12 STEPHAN. SYNERGY XD Everolimus drug-eluting stent 2.25 mm x 28 mm was advanced to the lesion and implanted at 12 STEPHAN. The stent was post dilated using a non-compliant balloon 2.5 mm x 15 mm at 16-20 STEPHAN. The stenosis was successfully reduced from 99% to 0%. Post-intervention MASHA flow was 3. Upfront antiplatelets were ASA and clopidogrel. Heparin was given IV to achieve an ACT > 300. 6Fr EBU 3.5 guide catheter was used to engage the LMCA. .014 BMW guidewire was placed into the LCx with distal tip in the upper branch of OM1. A .014 Runthrough wire was used to cross lesion with tip in the distal lower branch of OM1. The lesion was dilated with SC 2.0 x 15mm balloon at 12 stephan. A Synergy XD  2.25 x 28mm LU was deployed in the lower branch of OM1 at 12 stephan. The stent was postdilated with an NC 2.5mm balloon at 16-18 in the mid-distal portion, and at 20 stephan in the proximal portion. Good angioraphic result with MASHA flow in the vesse, no residual dissection or evidence of distal embolization. Guidwires and guide were withdrawn, and the patient was chest pain free upon transfer to the ICU.    Coronary Lesion Summary:  Vessel   Stenosis   Vessel Segment  LAD      ~ 30-40%        mid  LAD    30% stenosis      mid  OM 1      95-99%         mid            Complications:  No in-lab complications observed.    Cardiac Cath Transition of Care Summary:  Post Procedure           LU of Circumflex.  Diagnosis:  Blood Loss:              Estimated blood loss during the procedure was minimal  mls.  Specimens Removed:       Number of specimen(s) removed: for ACT's.      Recommendations:  Maximize medical therapy.  Agressive risk factor modification efforts.  Telemetry monitoring.  Follow-up with cardiology clinic.  Monitor vitals and arterial access site/pulses.  Anti-platelet therapy with Aspirin and Ticagrelor 90mgs BID.  Lipid lowering agent or Statin therapy.  Consider  referral to cardiac rehabilitation.  Beta blocker therapy.  Angiotensin-converting enzyme (ACE) inhibitor for LV systolic dysfunction.    ____________________________________________________________________________________  CONCLUSIONS:  1. Unstable angina s/p successful PCI of lower branch of OM1 culprit lesion.  2. Mild-moderate LAD disease.  3. Mild diffuse RCA disease. Codominant system.    ____________________________________________________________________________________  CPT Codes:  Coronary Angiography S&I only ([facility])([facility])-99462; Revasc during AMI stent/angio/atherc,ins asp Thrombectomy, Left Circumflex single Vessel (PCI)-97353.LC; Moderate Sedation Services initial 15 minutes patient >5 years-11329; Moderate Sedation Services 1st additional 15 minutes patient >5 years-66819; Moderate Sedation Services 2nd additional 15 minutes patient >5 years-88335; Moderate Sedation Services 3rd additional 15 minutes patient >5 years-63466; Moderate Sedation Services 4th additional 15 minutes patient >5 years-54200    ICD 10 Codes:  I20.0-Unstable angina; R94.30-Abnormal result of cardiovascular function study, unspecified    [id number] Aric Maldonado MD  Performing Physician  Electronically signed by [id number] Aric Maldonado MD on 3/2/2023 at 8:56:00 AM      cc Report to: MARY PURCELL           Final   No results found for this or any previous visit from the past 3650 days.     Cardiac Scoring: No results found for this or any previous visit from the past 1825 days.    AAA : No results found for this or any previous visit from the past 1825 days.    OTHER: No results found for this or any previous visit from the past 1825 days.    LAST IMAGING RESULTS  OUTSIDE GENERIC TESTING  Ordered by an unspecified provider.  Problem List Items Addressed This Visit       Dyslipidemia    Relevant Medications    atorvastatin (Lipitor) 40 mg tablet    clopidogrel (Plavix) 75 mg tablet    Other Relevant Orders    ECG 12 Lead (Completed)    Follow Up  In Cardiology    Hypertension - Primary    Relevant Medications    atorvastatin (Lipitor) 40 mg tablet    clopidogrel (Plavix) 75 mg tablet    Other Relevant Orders    ECG 12 Lead (Completed)    Follow Up In Cardiology    CAD (coronary artery disease)    Relevant Medications    metoprolol succinate XL (Toprol-XL) 25 mg 24 hr tablet    atorvastatin (Lipitor) 40 mg tablet    clopidogrel (Plavix) 75 mg tablet    Other Relevant Orders    ECG 12 Lead (Completed)    Follow Up In Cardiology    Statin intolerance   ;         Franco Luis DO, FACC, FACOI

## 2023-12-29 ENCOUNTER — OFFICE VISIT (OUTPATIENT)
Dept: CARDIOLOGY | Facility: CLINIC | Age: 78
End: 2023-12-29
Payer: MEDICARE

## 2023-12-29 VITALS
HEIGHT: 67 IN | HEART RATE: 65 BPM | SYSTOLIC BLOOD PRESSURE: 126 MMHG | BODY MASS INDEX: 26.12 KG/M2 | WEIGHT: 166.4 LBS | DIASTOLIC BLOOD PRESSURE: 94 MMHG

## 2023-12-29 DIAGNOSIS — E78.5 DYSLIPIDEMIA: ICD-10-CM

## 2023-12-29 DIAGNOSIS — Z78.9 STATIN INTOLERANCE: ICD-10-CM

## 2023-12-29 DIAGNOSIS — I10 PRIMARY HYPERTENSION: Primary | ICD-10-CM

## 2023-12-29 DIAGNOSIS — I25.10 CORONARY ARTERY DISEASE INVOLVING NATIVE CORONARY ARTERY OF NATIVE HEART WITHOUT ANGINA PECTORIS: ICD-10-CM

## 2023-12-29 PROCEDURE — 93000 ELECTROCARDIOGRAM COMPLETE: CPT | Performed by: INTERNAL MEDICINE

## 2023-12-29 PROCEDURE — 1036F TOBACCO NON-USER: CPT | Performed by: INTERNAL MEDICINE

## 2023-12-29 PROCEDURE — 99214 OFFICE O/P EST MOD 30 MIN: CPT | Performed by: INTERNAL MEDICINE

## 2023-12-29 PROCEDURE — 1160F RVW MEDS BY RX/DR IN RCRD: CPT | Performed by: INTERNAL MEDICINE

## 2023-12-29 PROCEDURE — 1159F MED LIST DOCD IN RCRD: CPT | Performed by: INTERNAL MEDICINE

## 2023-12-29 PROCEDURE — 3074F SYST BP LT 130 MM HG: CPT | Performed by: INTERNAL MEDICINE

## 2023-12-29 PROCEDURE — 3080F DIAST BP >= 90 MM HG: CPT | Performed by: INTERNAL MEDICINE

## 2023-12-29 RX ORDER — CLOPIDOGREL BISULFATE 75 MG/1
75 TABLET ORAL DAILY
Qty: 90 TABLET | Refills: 1 | Status: SHIPPED | OUTPATIENT
Start: 2023-12-29

## 2023-12-29 RX ORDER — ATORVASTATIN CALCIUM 40 MG/1
40 TABLET, FILM COATED ORAL NIGHTLY
Qty: 90 TABLET | Refills: 1 | Status: SHIPPED | OUTPATIENT
Start: 2023-12-29 | End: 2024-01-24 | Stop reason: SINTOL

## 2024-01-05 ENCOUNTER — TELEPHONE (OUTPATIENT)
Dept: UROLOGY | Facility: CLINIC | Age: 79
End: 2024-01-05
Payer: MEDICARE

## 2024-01-05 DIAGNOSIS — Z12.5 SCREENING PSA (PROSTATE SPECIFIC ANTIGEN): Primary | ICD-10-CM

## 2024-01-05 NOTE — TELEPHONE ENCOUNTER
Patient coming in to see Saloni  in a few weeks, can you please put a order in for him to get PSA done  Thank you

## 2024-01-11 ENCOUNTER — TELEPHONE (OUTPATIENT)
Dept: PRIMARY CARE | Facility: CLINIC | Age: 79
End: 2024-01-11
Payer: MEDICARE

## 2024-01-11 NOTE — TELEPHONE ENCOUNTER
Med refill     Elpurinal 300 mg    90 day supply 3 refills      [facility] - [place], [place] - [address]  [address], [facility] [address]  Phone: [phone]  Fax: [phone]     Wants to upgrade to full blood test

## 2024-01-12 DIAGNOSIS — Z87.39 HISTORY OF GOUT: Primary | ICD-10-CM

## 2024-01-12 RX ORDER — ALLOPURINOL 300 MG/1
TABLET ORAL
Qty: 45 TABLET | Refills: 1 | Status: SHIPPED | OUTPATIENT
Start: 2024-01-12

## 2024-01-15 ENCOUNTER — LAB (OUTPATIENT)
Dept: LAB | Facility: LAB | Age: 79
End: 2024-01-15
Payer: MEDICARE

## 2024-01-15 DIAGNOSIS — Z87.39 HISTORY OF GOUT: ICD-10-CM

## 2024-01-15 DIAGNOSIS — Z12.5 SCREENING PSA (PROSTATE SPECIFIC ANTIGEN): ICD-10-CM

## 2024-01-15 DIAGNOSIS — E55.9 VITAMIN D DEFICIENCY: ICD-10-CM

## 2024-01-15 DIAGNOSIS — R97.20 ELEVATED PSA: Primary | ICD-10-CM

## 2024-01-15 DIAGNOSIS — R73.9 HYPERGLYCEMIA: ICD-10-CM

## 2024-01-15 DIAGNOSIS — E78.5 DYSLIPIDEMIA: ICD-10-CM

## 2024-01-15 DIAGNOSIS — R74.8 ELEVATED VITAMIN B12 LEVEL: ICD-10-CM

## 2024-01-15 DIAGNOSIS — I10 PRIMARY HYPERTENSION: ICD-10-CM

## 2024-01-15 DIAGNOSIS — Z13.29 SCREENING FOR THYROID DISORDER: ICD-10-CM

## 2024-01-15 DIAGNOSIS — R79.89 LOW VITAMIN B12 LEVEL: ICD-10-CM

## 2024-01-15 LAB
ANION GAP SERPL CALC-SCNC: 12 MMOL/L (ref 10–20)
BUN SERPL-MCNC: 18 MG/DL (ref 6–23)
CALCIUM SERPL-MCNC: 10.4 MG/DL (ref 8.6–10.3)
CHLORIDE SERPL-SCNC: 104 MMOL/L (ref 98–107)
CO2 SERPL-SCNC: 29 MMOL/L (ref 21–32)
CREAT SERPL-MCNC: 0.89 MG/DL (ref 0.5–1.3)
EGFRCR SERPLBLD CKD-EPI 2021: 87 ML/MIN/1.73M*2
EST. AVERAGE GLUCOSE BLD GHB EST-MCNC: 105 MG/DL
GLUCOSE SERPL-MCNC: 100 MG/DL (ref 74–99)
HBA1C MFR BLD: 5.3 %
POTASSIUM SERPL-SCNC: 4.4 MMOL/L (ref 3.5–5.3)
PSA SERPL-MCNC: 17 NG/ML
SODIUM SERPL-SCNC: 141 MMOL/L (ref 136–145)
VIT B12 SERPL-MCNC: 1357 PG/ML (ref 211–911)

## 2024-01-15 PROCEDURE — 80048 BASIC METABOLIC PNL TOTAL CA: CPT

## 2024-01-15 PROCEDURE — 82607 VITAMIN B-12: CPT

## 2024-01-15 PROCEDURE — G0103 PSA SCREENING: HCPCS

## 2024-01-15 PROCEDURE — 83036 HEMOGLOBIN GLYCOSYLATED A1C: CPT

## 2024-01-15 PROCEDURE — 36415 COLL VENOUS BLD VENIPUNCTURE: CPT

## 2024-01-16 ENCOUNTER — TELEPHONE (OUTPATIENT)
Dept: PRIMARY CARE | Facility: CLINIC | Age: 79
End: 2024-01-16
Payer: MEDICARE

## 2024-01-22 NOTE — PROGRESS NOTES
[facility] - Dr. Marcelino Guallpa    Established Patient  Visit    PCP: Esdras Newell, APRN-CNP    Chief Complaint/Reason for visit: FU elevated PSA    HPI:   Had episode of hematospermia x1 2 weeks ago  Having some BL shoulder pain - decreased Lipitor dosage per cardiology        Lab Results   Component Value Date    PSA 17.00 (H) 01/15/2024    PSA 10.64 (H) 05/15/2023    PSA 9.96 (H) 10/10/2022         1/24/23  Established  1. Elevated PSA  TRUS bx 1/6/23 13/13 cores negative for malignancy   MRI 12/8/22 PI-RADS 3 lesion transition zone 1.6 cm; 62 g; PSAD 0.16  PSA 9.96 10/2022  PSA 8.36 3/2022  PSA 9.34 10/2021  Confirm MDX 2018 negative   Negative TRUS 2018  Father with prostate cancer history        Past Medical History:   Diagnosis Date    Arthropathy, unspecified     Arthropathy    Hematospermia     Hematospermia    Personal history of other diseases of male genital organs     History of chronic prostatitis    Personal history of other specified conditions     History of urinary frequency    Sinus congestion 01/31/2023    Subjective fever 01/31/2023     Past Surgical History:   Procedure Laterality Date    HERNIA REPAIR      HERNIA REPAIR  05/18/2017    Hernia Repair    HIP SURGERY  05/18/2017    Hip Surgery    JOINT REPLACEMENT       Social History     Socioeconomic History    Marital status:      Spouse name: Not on file    Number of children: Not on file    Years of education: Not on file    Highest education level: Not on file   Occupational History    Not on file   Tobacco Use    Smoking status: Never    Smokeless tobacco: Never   Vaping Use    Vaping Use: Never used   Substance and Sexual Activity    Alcohol use: Not Currently     Comment: rarely    Drug use: Never    Sexual activity: Not on file   Other Topics Concern    Not on file   Social History Narrative    Not on file     Social Determinants of Health     Financial Resource Strain: Not on file   Food Insecurity: Not on file    Transportation Needs: Not on file   Physical Activity: Not on file   Stress: Not on file   Social Connections: Not on file   Intimate Partner Violence: Not on file   Housing Stability: Not on file     Current Outpatient Medications   Medication Instructions    acetaminophen (TYLENOL) 1,000 mg, oral, Daily RT    allopurinol (Zyloprim) 300 mg tablet Take one tablet every other day    aspirin 81 mg, oral, Daily    atorvastatin (LIPITOR) 40 mg, oral, Nightly    cholecalciferol (Vitamin D-3) 5,000 Units tablet 2 tablets, oral, Daily    clopidogrel (PLAVIX) 75 mg, oral, Daily    fish oil concentrate (Omega-3) 120-180 mg capsule 1 g, oral, 2 times daily    Lactobacillus acidoph-L.bulgar 1 million cell tablet tablet 1 tablet, oral, Daily    magnesium oxide (Mag-Ox) 200 mg split tablet oral    meclizine (ANTIVERT) 25 mg, oral, Every 6 hours    metoprolol succinate XL (Toprol-XL) 25 mg 24 hr tablet     metoprolol tartrate (LOPRESSOR) 50 mg, oral, Daily    multivitamin tablet 1 tablet, oral, Daily    plant stanol cindy 450 mg tablet oral    tamsulosin (Flomax) 0.4 mg 24 hr capsule TAKE 1 CAPSULE BY MOUTH ONCE  DAILY    vitamin K2, MK-4, 100 mcg tablet 1 tablet, oral, Daily    zinc sulfate 66 mg tablet oral     No Known Allergies       Physical Exam:  General: Alert, cooperative, no acute distress  Eyes: Sclera clear  Cardiac: Extremities are warm and well perfused  Lungs: Breathing non-labored. Speaking in clear and complete sentences.  MSK: Ambulatory with steady gait   Neuro: Alert and oriented to person, place, and time  Psych: Normal mood and affect  Skin: No obvious lesions or rashes    Assessment and Plan:    1. Elevated PSA  Repeat PSA 4-6 weeks  Patient will follow up in [place] to discuss  Discussed hematospermia, reassurance provided  For persistent elevation patient would want repeat prostate MR. Reasonable given good functional status, ECOG 0-1   - PSA; Future

## 2024-01-23 ENCOUNTER — OFFICE VISIT (OUTPATIENT)
Dept: UROLOGY | Facility: CLINIC | Age: 79
End: 2024-01-23
Payer: MEDICARE

## 2024-01-23 DIAGNOSIS — R97.20 ELEVATED PSA: Primary | ICD-10-CM

## 2024-01-23 PROCEDURE — 99214 OFFICE O/P EST MOD 30 MIN: CPT | Performed by: STUDENT IN AN ORGANIZED HEALTH CARE EDUCATION/TRAINING PROGRAM

## 2024-01-23 PROCEDURE — 1157F ADVNC CARE PLAN IN RCRD: CPT | Performed by: STUDENT IN AN ORGANIZED HEALTH CARE EDUCATION/TRAINING PROGRAM

## 2024-01-23 PROCEDURE — 1159F MED LIST DOCD IN RCRD: CPT | Performed by: STUDENT IN AN ORGANIZED HEALTH CARE EDUCATION/TRAINING PROGRAM

## 2024-01-23 PROCEDURE — 1036F TOBACCO NON-USER: CPT | Performed by: STUDENT IN AN ORGANIZED HEALTH CARE EDUCATION/TRAINING PROGRAM

## 2024-01-23 PROCEDURE — 1160F RVW MEDS BY RX/DR IN RCRD: CPT | Performed by: STUDENT IN AN ORGANIZED HEALTH CARE EDUCATION/TRAINING PROGRAM

## 2024-01-24 DIAGNOSIS — E78.5 DYSLIPIDEMIA: Primary | ICD-10-CM

## 2024-01-24 RX ORDER — ROSUVASTATIN CALCIUM 20 MG/1
20 TABLET, COATED ORAL DAILY
Qty: 90 TABLET | Refills: 0 | Status: SHIPPED | OUTPATIENT
Start: 2024-01-24 | End: 2024-04-09

## 2024-01-24 NOTE — TELEPHONE ENCOUNTER
1/24/24  1223  Informed Dr. Luis of continued myalgia. Per Dr. Luis, okay to stop Atorvastatin and to switch to Rosuvastatin 20 mg daily.    Called and informed patient to plan to stop Atorvastatin and to start Rosuvastatin and that is is okay to be stopped on one statin for a little while before starting on the other due to gauging statin intolerance.    Patient verbalized understanding.      Atorvastatin discontinued and Rosuvastatin sent for approval.       ----- Message from Franco Luis DO sent at 1/23/2024  2:27 PM EST -----  Possibly none statin related if worsening on reduced dose.  Can discontinue atorvastatin and start rosuvastatin 20 mg daily in its place.  ----- Message -----  From: Huang Smith RN  Sent: 1/23/2024   2:20 PM EST  To: Franco Luis, DO    You cut his statin dosage in half; has not helped the myalgia. It is getting worse.

## 2024-01-26 DIAGNOSIS — I10 PRIMARY HYPERTENSION: Primary | ICD-10-CM

## 2024-01-26 DIAGNOSIS — I25.10 CORONARY ARTERY DISEASE INVOLVING NATIVE CORONARY ARTERY OF NATIVE HEART WITHOUT ANGINA PECTORIS: ICD-10-CM

## 2024-01-26 RX ORDER — METOPROLOL SUCCINATE 25 MG/1
25 TABLET, EXTENDED RELEASE ORAL DAILY
Qty: 90 TABLET | Refills: 1 | Status: SHIPPED | OUTPATIENT
Start: 2024-01-26

## 2024-01-29 ENCOUNTER — TELEPHONE (OUTPATIENT)
Dept: PRIMARY CARE | Facility: CLINIC | Age: 79
End: 2024-01-29
Payer: MEDICARE

## 2024-01-29 NOTE — TELEPHONE ENCOUNTER
Would like a call back regarding pain that he is having in his shoulder. He would like advise on what you think he should do. He would liek to speak to you before he does anything.

## 2024-01-30 DIAGNOSIS — M25.512 BILATERAL SHOULDER PAIN, UNSPECIFIED CHRONICITY: Primary | ICD-10-CM

## 2024-01-30 DIAGNOSIS — M25.511 BILATERAL SHOULDER PAIN, UNSPECIFIED CHRONICITY: Primary | ICD-10-CM

## 2024-02-07 DIAGNOSIS — M25.511 BILATERAL SHOULDER PAIN, UNSPECIFIED CHRONICITY: ICD-10-CM

## 2024-02-07 DIAGNOSIS — M25.512 BILATERAL SHOULDER PAIN, UNSPECIFIED CHRONICITY: ICD-10-CM

## 2024-02-12 ENCOUNTER — OFFICE VISIT (OUTPATIENT)
Dept: ORTHOPEDIC SURGERY | Facility: CLINIC | Age: 79
End: 2024-02-12
Payer: MEDICARE

## 2024-02-12 ENCOUNTER — HOSPITAL ENCOUNTER (OUTPATIENT)
Dept: RADIOLOGY | Facility: CLINIC | Age: 79
Discharge: HOME | End: 2024-02-12
Payer: MEDICARE

## 2024-02-12 VITALS — HEIGHT: 67 IN | WEIGHT: 166 LBS | BODY MASS INDEX: 26.06 KG/M2

## 2024-02-12 DIAGNOSIS — M19.011 OSTEOARTHRITIS OF GLENOHUMERAL JOINTS, BILATERAL: Primary | ICD-10-CM

## 2024-02-12 DIAGNOSIS — M25.511 BILATERAL SHOULDER PAIN, UNSPECIFIED CHRONICITY: ICD-10-CM

## 2024-02-12 DIAGNOSIS — M19.012 OSTEOARTHRITIS OF GLENOHUMERAL JOINTS, BILATERAL: Primary | ICD-10-CM

## 2024-02-12 DIAGNOSIS — M25.512 BILATERAL SHOULDER PAIN, UNSPECIFIED CHRONICITY: ICD-10-CM

## 2024-02-12 PROCEDURE — 1157F ADVNC CARE PLAN IN RCRD: CPT | Performed by: STUDENT IN AN ORGANIZED HEALTH CARE EDUCATION/TRAINING PROGRAM

## 2024-02-12 PROCEDURE — 1159F MED LIST DOCD IN RCRD: CPT | Performed by: STUDENT IN AN ORGANIZED HEALTH CARE EDUCATION/TRAINING PROGRAM

## 2024-02-12 PROCEDURE — 73030 X-RAY EXAM OF SHOULDER: CPT | Mod: 50

## 2024-02-12 PROCEDURE — 99204 OFFICE O/P NEW MOD 45 MIN: CPT | Performed by: STUDENT IN AN ORGANIZED HEALTH CARE EDUCATION/TRAINING PROGRAM

## 2024-02-12 PROCEDURE — 73030 X-RAY EXAM OF SHOULDER: CPT | Mod: BILATERAL PROCEDURE | Performed by: STUDENT IN AN ORGANIZED HEALTH CARE EDUCATION/TRAINING PROGRAM

## 2024-02-12 PROCEDURE — 1125F AMNT PAIN NOTED PAIN PRSNT: CPT | Performed by: STUDENT IN AN ORGANIZED HEALTH CARE EDUCATION/TRAINING PROGRAM

## 2024-02-12 PROCEDURE — 1160F RVW MEDS BY RX/DR IN RCRD: CPT | Performed by: STUDENT IN AN ORGANIZED HEALTH CARE EDUCATION/TRAINING PROGRAM

## 2024-02-12 PROCEDURE — 1036F TOBACCO NON-USER: CPT | Performed by: STUDENT IN AN ORGANIZED HEALTH CARE EDUCATION/TRAINING PROGRAM

## 2024-02-12 RX ORDER — METHOCARBAMOL 750 MG/1
750 TABLET, FILM COATED ORAL 3 TIMES DAILY
Qty: 30 TABLET | Refills: 0 | Status: SHIPPED | OUTPATIENT
Start: 2024-02-12 | End: 2024-02-14 | Stop reason: SDUPTHER

## 2024-02-12 ASSESSMENT — PAIN SCALES - GENERAL: PAINLEVEL_OUTOF10: 1

## 2024-02-12 ASSESSMENT — PAIN - FUNCTIONAL ASSESSMENT: PAIN_FUNCTIONAL_ASSESSMENT: 0-10

## 2024-02-12 NOTE — PROGRESS NOTES
PRIMARY CARE PHYSICIAN: REBECA Sylvester  REFERRING PROVIDER: REBECA Sylvester  [address]  [place] [address]     CONSULT ORTHOPAEDIC: Shoulder Evaluation    ASSESSMENT & PLAN    Impression: 78 y.o. male with bilateral shoulder glenohumeral osteoarthritis.    Plan:   I explained to the patient the nature of their diagnosis.  I reviewed their imaging studies with them.    Based on the history, physical exam and imaging studies above, the patient's presentation is consistent with the above diagnosis.  I had a long discussion with the patient regarding their presentation and the treatment options.  We discussed initial nonoperative versus operative management options as well as potential further diagnostic imaging.  I reviewed the patient's imaging studies with him.  He has bilateral glenohumeral osteoarthritis.  We discussed treatment options going forward including initial nonoperative management.  I provided him with a home exercise program for shoulder range of motion, rotator cuff strengthening and scapular stabilization.  He will maintain as much range of motion as possible.  I also provided him with a muscle relaxer as he is having pain along his periscapular musculature related to his shoulder osteoarthritis.    Follow-Up: Patient will follow-up as needed    At the end of the visit, all questions were answered in full. The patient is in agreement with the plan and recommendations. They will call the office with any questions/concerns.    Note dictated with [employer] software. Completed without full typed error editing and sent to avoid delay.       SUBJECTIVE  CHIEF COMPLAINT: Bilateral shoulder pain    HPI: Jose Antonio Rubio is a 78 y.o. patient. Jose Antonio Rubio complains of bilateral shoulder pain. Patient states he started noticing pain about 3 months ago.  He has pain deep in the shoulder joints.  He notes crepitus in the  shoulder.  He has limitations in his range of motion.  He has positive night pain.  He takes Tylenol and uses heat.  No other formal treatments.    They deny any associated neck pain.  No numbness, tingling, or paresthesias.    REVIEW OF SYSTEMS  Constitutional: See HPI for pain assessment, No significant weight loss, recent trauma  Cardiovascular: No chest pain, shortness of breath  Respiratory: No difficulty breathing, cough  Gastrointestinal: No nausea, vomiting, diarrhea, constipation  Musculoskeletal: Noted in HPI, positive for pain, restricted motion, stiffness  Integumentary: No rashes, easy bruising, redness   Neurological: no numbness or tingling in extremities, no gait disturbances   Psychiatric: No mood changes, memory changes, social issues  Heme/Lymph: no excessive swelling, easy bruising, excessive bleeding  ENT: No hearing changes  Eyes: No vision changes    Past Medical History:   Diagnosis Date    Arthropathy, unspecified     Arthropathy    Hematospermia     Hematospermia    Personal history of other diseases of male genital organs     History of chronic prostatitis    Personal history of other specified conditions     History of urinary frequency    Sinus congestion 01/31/2023    Subjective fever 01/31/2023        No Known Allergies     Past Surgical History:   Procedure Laterality Date    HERNIA REPAIR      HERNIA REPAIR  05/18/2017    Hernia Repair    HIP SURGERY  05/18/2017    Hip Surgery    JOINT REPLACEMENT          No family history on file.     Social History     Socioeconomic History    Marital status:      Spouse name: Not on file    Number of children: Not on file    Years of education: Not on file    Highest education level: Not on file   Occupational History    Not on file   Tobacco Use    Smoking status: Never    Smokeless tobacco: Never   Vaping Use    Vaping Use: Never used   Substance and Sexual Activity    Alcohol use: Not Currently     Comment: rarely    Drug use: Never     Sexual activity: Not on file   Other Topics Concern    Not on file   Social History Narrative    Not on file     Social Determinants of Health     Financial Resource Strain: Not on file   Food Insecurity: Not on file   Transportation Needs: Not on file   Physical Activity: Not on file   Stress: Not on file   Social Connections: Not on file   Intimate Partner Violence: Not on file   Housing Stability: Not on file        CURRENT MEDICATIONS:   Current Outpatient Medications   Medication Sig Dispense Refill    acetaminophen (Tylenol) 500 mg tablet Take 2 tablets (1,000 mg) by mouth once daily.      allopurinol (Zyloprim) 300 mg tablet Take one tablet every other day 45 tablet 1    aspirin 81 mg EC tablet Take 1 tablet (81 mg) by mouth once daily.      cholecalciferol (Vitamin D-3) 5,000 Units tablet Take 2 tablets (10,000 Units) by mouth once daily.      clopidogrel (Plavix) 75 mg tablet Take 1 tablet (75 mg) by mouth once daily. 90 tablet 1    fish oil concentrate (Omega-3) 120-180 mg capsule Take 1 capsule (1 g) by mouth twice a day.      Lactobacillus acidoph-L.bulgar 1 million cell tablet tablet Take 1 tablet by mouth once daily.      magnesium oxide (Mag-Ox) 200 mg split tablet Take by mouth.      meclizine (Antivert) 25 mg tablet Take 1 tablet (25 mg) by mouth every 6 hours.      metoprolol succinate XL (Toprol-XL) 25 mg 24 hr tablet Take 1 tablet (25 mg) by mouth once daily. Do not crush or chew. 90 tablet 1    multivitamin tablet Take 1 tablet by mouth once daily.      plant stanol cindy 450 mg tablet Take by mouth.      rosuvastatin (Crestor) 20 mg tablet Take 1 tablet (20 mg) by mouth once daily. 90 tablet 0    tamsulosin (Flomax) 0.4 mg 24 hr capsule TAKE 1 CAPSULE BY MOUTH ONCE  DAILY 90 capsule 3    vitamin K2, MK-4, 100 mcg tablet Take 1 tablet by mouth in the morning.      zinc sulfate 66 mg tablet Take by mouth.       No current facility-administered medications for this visit.     "    OBJECTIVE    PHYSICAL EXAM      3/9/2023     3:27 PM 4/3/2023     1:38 PM 6/30/2023     1:39 PM 7/14/2023     1:22 PM 9/8/2023    12:50 PM 10/3/2023     1:40 PM 12/29/2023     3:27 PM   Vitals   Systolic 121 130 150   140 126   Diastolic 80 70 82   68 94   Heart Rate 61 55 59   69 65   Temp    36.1 °C (96.9 °F) 36.6 °C (97.8 °F)     Resp  16        Height (in) 1.727 m (5' 8\") 1.727 m (5' 8\") 1.727 m (5' 8\") 1.727 m (5' 8\") 1.695 m (5' 6.75\") 1.727 m (5' 8\") 1.702 m (5' 7\")   Weight (lb) 172 169 167.25 165.7 163.4 165 166.4   BMI 26.15 kg/m2 25.7 kg/m2 25.43 kg/m2 25.19 kg/m2 25.78 kg/m2 25.09 kg/m2 26.06 kg/m2   BSA (m2) 1.93 m2 1.92 m2 1.91 m2 1.9 m2 1.87 m2 1.89 m2 1.89 m2   Visit Report  Report    Report Report      There is no height or weight on file to calculate BMI.    GENERAL: A/Ox3, NAD. Appears healthy, well nourished  PSYCHIATRIC: Mood stable, appropriate memory recall  EYES: EOM intact, no scleral icterus  CARDIOVASCULAR: Palpable peripheral pulses  LUNGS: Breathing non-labored on room air  SKIN: no erythema, rashes, or ecchymosis     MUSCULOSKELETAL:  Laterality: bilateral Shoulder Exam  - Negative Spurlings, full painless neck ROM  - Skin intact  - No erythema or warmth  - No ecchymosis or soft tissue swelling  - Shoulder ROM: Forward flexion 120, ER 30, IR lower lumbar  - Strength:      Jobes 4+/5     ER 4+/5     Belly press and bearhug 4+/5  - Palpation: Positive tenderness biceps groove and posterior joint line  - Vazquez and Neers positive  - Speeds and O'Briens positive    NEUROVASCULAR:  - Neurovascular Status: sensation intact to light touch distally, upper extremity motor grossly intact  - Capillary refill brisk at extremities, Bilateral peripheral pulses 2+    Imaging: Multiple views of the affected bilateral shoulder(s) demonstrate: Bilateral degenerative changes of the glenohumeral joint.   X-rays were personally reviewed and interpreted by me.  Radiology reports were reviewed by me as " well, if readily available at the time.      Derek Renae MD  Attending Surgeon    Sports Medicine Orthopaedic Surgery  [facility] [employer]

## 2024-02-13 ENCOUNTER — TELEPHONE (OUTPATIENT)
Dept: ORTHOPEDIC SURGERY | Facility: CLINIC | Age: 79
End: 2024-02-13
Payer: MEDICARE

## 2024-02-13 NOTE — TELEPHONE ENCOUNTER
Patient prescription that was sent to the wrong pharmacy yesterday. Can you please resend to the [facility] in [place]. Methocarbamol is the medication

## 2024-02-14 ENCOUNTER — TELEPHONE (OUTPATIENT)
Dept: CARDIOLOGY | Facility: CLINIC | Age: 79
End: 2024-02-14
Payer: MEDICARE

## 2024-02-14 DIAGNOSIS — M25.512 BILATERAL SHOULDER PAIN, UNSPECIFIED CHRONICITY: ICD-10-CM

## 2024-02-14 DIAGNOSIS — M25.511 BILATERAL SHOULDER PAIN, UNSPECIFIED CHRONICITY: ICD-10-CM

## 2024-02-14 RX ORDER — METHOCARBAMOL 750 MG/1
750 TABLET, FILM COATED ORAL 3 TIMES DAILY
Qty: 30 TABLET | Refills: 0 | Status: SHIPPED | OUTPATIENT
Start: 2024-02-14 | End: 2024-04-05

## 2024-02-14 NOTE — TELEPHONE ENCOUNTER
2/14/24  1551  Patient called in to report he is still having myalgia from the Atorvastatin. He saw a orthopod thinking it was a musculoskeletal problem but it was not.  Patient is due to start his Crestor as it arrived but it still having some myalgia; although it is improved he is concerned over starting another statin and not knowing if the myalgia is from the atorvastatin or the Crestor.  Patient also wanted to know how Dr. Luis would feel about him taking  muscle relaxer medications which were prescribed to him.    Informed patient that Dr. Luis has no opinion regarding if patient takes a muscle relaxant as it was prescribed by another provider.  Nurse also instructed patient to continue to hold the Crestor for one week, two weeks at the most and then try the medication.  Nurse also instructed patient to call within 1-2 days after starting Crestor; it patient is having myalgia then nurse will want to know sooner so alternative medications can be explored.    Patient verbalized understanding of instructions.

## 2024-02-22 ENCOUNTER — LAB (OUTPATIENT)
Dept: LAB | Facility: LAB | Age: 79
End: 2024-02-22
Payer: MEDICARE

## 2024-02-22 DIAGNOSIS — R97.20 ELEVATED PSA: ICD-10-CM

## 2024-02-22 LAB — PSA SERPL-MCNC: 17.22 NG/ML

## 2024-02-22 PROCEDURE — 84153 ASSAY OF PSA TOTAL: CPT

## 2024-02-22 PROCEDURE — 36415 COLL VENOUS BLD VENIPUNCTURE: CPT

## 2024-02-29 ENCOUNTER — OFFICE VISIT (OUTPATIENT)
Dept: UROLOGY | Facility: CLINIC | Age: 79
End: 2024-02-29
Payer: MEDICARE

## 2024-02-29 DIAGNOSIS — R97.20 ELEVATED PSA: ICD-10-CM

## 2024-02-29 DIAGNOSIS — N40.1 BENIGN PROSTATIC HYPERPLASIA WITH LOWER URINARY TRACT SYMPTOMS, SYMPTOM DETAILS UNSPECIFIED: Primary | ICD-10-CM

## 2024-02-29 LAB
POC BILIRUBIN, URINE: NEGATIVE
POC BLOOD, URINE: NEGATIVE
POC GLUCOSE, URINE: NEGATIVE MG/DL
POC KETONES, URINE: NEGATIVE MG/DL
POC LEUKOCYTES, URINE: NEGATIVE
POC NITRITE,URINE: NEGATIVE
POC PH, URINE: 6.5 PH
POC PROTEIN, URINE: NEGATIVE MG/DL
POC SPECIFIC GRAVITY, URINE: 1.02
POC UROBILINOGEN, URINE: 0.2 EU/DL

## 2024-02-29 PROCEDURE — 1159F MED LIST DOCD IN RCRD: CPT | Performed by: UROLOGY

## 2024-02-29 PROCEDURE — 1160F RVW MEDS BY RX/DR IN RCRD: CPT | Performed by: UROLOGY

## 2024-02-29 PROCEDURE — 1036F TOBACCO NON-USER: CPT | Performed by: UROLOGY

## 2024-02-29 PROCEDURE — 99213 OFFICE O/P EST LOW 20 MIN: CPT | Performed by: UROLOGY

## 2024-02-29 PROCEDURE — 81003 URINALYSIS AUTO W/O SCOPE: CPT | Performed by: UROLOGY

## 2024-02-29 PROCEDURE — 1125F AMNT PAIN NOTED PAIN PRSNT: CPT | Performed by: UROLOGY

## 2024-02-29 PROCEDURE — 1157F ADVNC CARE PLAN IN RCRD: CPT | Performed by: UROLOGY

## 2024-02-29 NOTE — PROGRESS NOTES
02/29/2024  Patient has a long history of elevated PSA, negative prostate biopsy x 4.    Voiding well    RAQUEL: Deferred    PSA 17    We had a long discussed about elevated PSA.  Negative prostate biopsy x 4,  We discussed repeat prostate biopsy versus continued monitoring PSA  All the questions were answered, the patient expressed understanding and agreed to the plan.    Impression  Elevated PSA  Benign prostate hypertrophy    Plan  Repeat PSA in 3 months  Appointment in 3 months  Possible fifth prostate biopsy if PSA more than 20 (patient is very anxious)  Reassurance      Chief Complaint   Patient presents with    Elevated PSA     Patient here today for follow up due to elevated psa, previous patient of Dr. Guallpa.   Patient voiding well, nocturia x 2    PSA 1/15/24 17.00  PSA 2/22/24 17.22    Last Visit Plan:  Repeat PSA 4-6 weeks  For persistent elevation patient would want repeat prostate MR. Reasonable given good functional status, ECOG 0-1           Physical Exam     TODAYS LAB RESULTS:    No urine sample given today.    ASSESSMENT&PLAN:      IMPRESSIONS:    1/24/23-Dr. Guallpa  Established  1. Elevated PSA  TRUS bx 1/6/23 13/13 cores negative for malignancy   MRI 12/8/22 PI-RADS 3 lesion transition zone 1.6 cm; 62 g; PSAD 0.16  PSA 9.96 10/2022  PSA 8.36 3/2022  PSA 9.34 10/2021  Confirm MDX 2018 negative   Negative TRUS 2018  Father with prostate cancer history          03/17/2022  Voiding well on Flomax 0.4 mg daily nocturia 1-2     Patient has no nausea, no vomiting, no fever.     Patient here today for annual follow up BPH and elevated PSA.     Denies urinary frequency, urgency and nocturia. Slow stream with first void of the morning.     Taking Flomax 0.4 QHS and will need refill for 90 day supply.     RAQUEL: 2+, no nodule     PSA 3/9/22 was 8.36. Stable      IO Glucose - Urine Negative REQUIRED    IO Bilirubin Negative REQUIRED    IO Ketones Negative REQUIRED    IO Specific Gravity 1.020 REQUIRED    IO  Blood Negative REQUIRED    IO pH 7.0 REQUIRED    IO Protein, Urine Negative REQUIRED    IO Urobilinogen Normal (0.2-1.0 mg/dl) REQUIRED    IO Nitrite, Urine Negative REQUIRED    IO Leukocytes Negative REQUIRED      We discussed elevated PSA, stable, negative prostate biopsy x3  We discussed benign prostate hypertrophy with mild to moderate voiding symptoms on Flomax 0.4 mg daily  All the questions were answered, the patient expressed understanding and agreed to the plan.     Impression  Elevated PSA, negative prostate biopsy x3  BPH  Dysuria  Nocturia     Plan  Continue Flomax 0.4 mg daily  Monitor PSA  Yearly RAQUEL and PSA        3/11/2021 HBM  76-year-old male who is here today for follow-up of elevated PSA. Patient is addition has bladder outlet obstruction is been taking tamsulosin states that he is voiding without difficulty. He is had 3 negative transrectal ultrasound and biopsies of the prostate in the past his most recent PSA was 10.75 in February 2021 his prior PSA 6 months ago was greater than 11. He denies any blood or burning on urination. His urinalysis today was completely within normal limits.        PSA   2/22/2417.22  1/15/2024 17.00  5/15/2023 10.64  10/10/2022 9.96  9/7/22 12.49  3/9/22 8.36.  3/2021 10.75     Surgery  1/6/23 prostate biopsy negative  Prostate biopsy (-)3 times before

## 2024-03-31 DIAGNOSIS — E78.5 DYSLIPIDEMIA: ICD-10-CM

## 2024-04-02 ENCOUNTER — LAB (OUTPATIENT)
Dept: LAB | Facility: LAB | Age: 79
End: 2024-04-02
Payer: MEDICARE

## 2024-04-02 DIAGNOSIS — I10 PRIMARY HYPERTENSION: ICD-10-CM

## 2024-04-02 DIAGNOSIS — Z13.29 SCREENING FOR THYROID DISORDER: ICD-10-CM

## 2024-04-02 DIAGNOSIS — Z87.39 HISTORY OF GOUT: ICD-10-CM

## 2024-04-02 DIAGNOSIS — E78.5 DYSLIPIDEMIA: ICD-10-CM

## 2024-04-02 DIAGNOSIS — R73.9 HYPERGLYCEMIA: ICD-10-CM

## 2024-04-02 DIAGNOSIS — R97.20 ELEVATED PSA: ICD-10-CM

## 2024-04-02 DIAGNOSIS — R74.8 ELEVATED VITAMIN B12 LEVEL: ICD-10-CM

## 2024-04-02 DIAGNOSIS — E55.9 VITAMIN D DEFICIENCY: ICD-10-CM

## 2024-04-02 LAB
25(OH)D3 SERPL-MCNC: 81 NG/ML (ref 30–100)
ALBUMIN SERPL BCP-MCNC: 4.5 G/DL (ref 3.4–5)
ALP SERPL-CCNC: 52 U/L (ref 33–136)
ALT SERPL W P-5'-P-CCNC: 15 U/L (ref 10–52)
ANION GAP SERPL CALC-SCNC: 12 MMOL/L (ref 10–20)
AST SERPL W P-5'-P-CCNC: 15 U/L (ref 9–39)
BILIRUB SERPL-MCNC: 0.6 MG/DL (ref 0–1.2)
BUN SERPL-MCNC: 19 MG/DL (ref 6–23)
CALCIUM SERPL-MCNC: 9.7 MG/DL (ref 8.6–10.3)
CHLORIDE SERPL-SCNC: 106 MMOL/L (ref 98–107)
CHOLEST SERPL-MCNC: 115 MG/DL (ref 0–199)
CHOLESTEROL/HDL RATIO: 2.4
CO2 SERPL-SCNC: 29 MMOL/L (ref 21–32)
CREAT SERPL-MCNC: 0.87 MG/DL (ref 0.5–1.3)
EGFRCR SERPLBLD CKD-EPI 2021: 87 ML/MIN/1.73M*2
ERYTHROCYTE [DISTWIDTH] IN BLOOD BY AUTOMATED COUNT: 13.7 % (ref 11.5–14.5)
EST. AVERAGE GLUCOSE BLD GHB EST-MCNC: 117 MG/DL
GLUCOSE SERPL-MCNC: 88 MG/DL (ref 74–99)
HBA1C MFR BLD: 5.7 %
HCT VFR BLD AUTO: 48.9 % (ref 41–52)
HDLC SERPL-MCNC: 47 MG/DL
HGB BLD-MCNC: 15.5 G/DL (ref 13.5–17.5)
LDLC SERPL CALC-MCNC: 47 MG/DL
MCH RBC QN AUTO: 29.8 PG (ref 26–34)
MCHC RBC AUTO-ENTMCNC: 31.7 G/DL (ref 32–36)
MCV RBC AUTO: 94 FL (ref 80–100)
NON HDL CHOLESTEROL: 68 MG/DL (ref 0–149)
NRBC BLD-RTO: 0 /100 WBCS (ref 0–0)
PLATELET # BLD AUTO: 270 X10*3/UL (ref 150–450)
POTASSIUM SERPL-SCNC: 4.6 MMOL/L (ref 3.5–5.3)
PROT SERPL-MCNC: 7.1 G/DL (ref 6.4–8.2)
PSA SERPL-MCNC: 16.78 NG/ML
RBC # BLD AUTO: 5.21 X10*6/UL (ref 4.5–5.9)
SODIUM SERPL-SCNC: 142 MMOL/L (ref 136–145)
TRIGL SERPL-MCNC: 104 MG/DL (ref 0–149)
TSH SERPL-ACNC: 2.02 MIU/L (ref 0.44–3.98)
URATE SERPL-MCNC: 5.4 MG/DL (ref 4–7.5)
VIT B12 SERPL-MCNC: 1055 PG/ML (ref 211–911)
VLDL: 21 MG/DL (ref 0–40)
WBC # BLD AUTO: 8.4 X10*3/UL (ref 4.4–11.3)

## 2024-04-02 PROCEDURE — 84550 ASSAY OF BLOOD/URIC ACID: CPT

## 2024-04-02 PROCEDURE — 83036 HEMOGLOBIN GLYCOSYLATED A1C: CPT

## 2024-04-02 PROCEDURE — 82306 VITAMIN D 25 HYDROXY: CPT

## 2024-04-02 PROCEDURE — 80061 LIPID PANEL: CPT

## 2024-04-02 PROCEDURE — 82607 VITAMIN B-12: CPT

## 2024-04-02 PROCEDURE — 84443 ASSAY THYROID STIM HORMONE: CPT

## 2024-04-02 PROCEDURE — 80053 COMPREHEN METABOLIC PANEL: CPT

## 2024-04-02 PROCEDURE — 85027 COMPLETE CBC AUTOMATED: CPT

## 2024-04-02 PROCEDURE — 36415 COLL VENOUS BLD VENIPUNCTURE: CPT

## 2024-04-02 PROCEDURE — 84153 ASSAY OF PSA TOTAL: CPT

## 2024-04-05 ENCOUNTER — OFFICE VISIT (OUTPATIENT)
Dept: PRIMARY CARE | Facility: CLINIC | Age: 79
End: 2024-04-05
Payer: MEDICARE

## 2024-04-05 VITALS
WEIGHT: 165.8 LBS | RESPIRATION RATE: 16 BRPM | HEIGHT: 67 IN | OXYGEN SATURATION: 96 % | HEART RATE: 65 BPM | DIASTOLIC BLOOD PRESSURE: 78 MMHG | SYSTOLIC BLOOD PRESSURE: 118 MMHG | BODY MASS INDEX: 26.02 KG/M2

## 2024-04-05 DIAGNOSIS — R73.03 PREDIABETES: ICD-10-CM

## 2024-04-05 DIAGNOSIS — R74.8 ELEVATED VITAMIN B12 LEVEL: ICD-10-CM

## 2024-04-05 DIAGNOSIS — N40.0 BENIGN PROSTATIC HYPERPLASIA WITHOUT LOWER URINARY TRACT SYMPTOMS: ICD-10-CM

## 2024-04-05 DIAGNOSIS — I25.10 CORONARY ARTERY DISEASE INVOLVING NATIVE CORONARY ARTERY OF NATIVE HEART WITHOUT ANGINA PECTORIS: ICD-10-CM

## 2024-04-05 DIAGNOSIS — Z00.00 MEDICARE ANNUAL WELLNESS VISIT, SUBSEQUENT: ICD-10-CM

## 2024-04-05 DIAGNOSIS — I25.10 ARTERIOSCLEROSIS OF CORONARY ARTERY: ICD-10-CM

## 2024-04-05 DIAGNOSIS — E78.5 DYSLIPIDEMIA: ICD-10-CM

## 2024-04-05 DIAGNOSIS — Z71.89 ADVANCE DIRECTIVE DISCUSSED WITH PATIENT: ICD-10-CM

## 2024-04-05 DIAGNOSIS — I10 PRIMARY HYPERTENSION: ICD-10-CM

## 2024-04-05 PROBLEM — N13.8 BENIGN LOCALIZED HYPERPLASIA OF PROSTATE WITH URINARY OBSTRUCTION: Status: ACTIVE | Noted: 2023-01-31

## 2024-04-05 PROBLEM — S76.019A STRAIN OF MUSCLE OF HIP: Status: ACTIVE | Noted: 2023-01-31

## 2024-04-05 PROBLEM — M12.9 ARTHROPATHY: Status: ACTIVE | Noted: 2024-04-05

## 2024-04-05 PROBLEM — R36.1 HEMOSPERMIA: Status: ACTIVE | Noted: 2024-04-05

## 2024-04-05 PROBLEM — N40.1 BENIGN LOCALIZED HYPERPLASIA OF PROSTATE WITH URINARY OBSTRUCTION: Status: ACTIVE | Noted: 2023-01-31

## 2024-04-05 PROBLEM — M25.559 ARTHRALGIA OF HIP: Status: ACTIVE | Noted: 2024-04-05

## 2024-04-05 PROBLEM — J34.89 NASAL MUCOSA DRY: Status: ACTIVE | Noted: 2024-04-05

## 2024-04-05 PROBLEM — R09.81 SINUS CONGESTION: Status: ACTIVE | Noted: 2024-04-05

## 2024-04-05 PROBLEM — R19.7 DIARRHEA: Status: ACTIVE | Noted: 2024-04-05

## 2024-04-05 PROBLEM — R52 BODY ACHES: Status: ACTIVE | Noted: 2024-04-05

## 2024-04-05 PROBLEM — Z95.5 PRESENCE OF STENT IN CORONARY ARTERY: Status: ACTIVE | Noted: 2023-04-06

## 2024-04-05 PROBLEM — J34.89 NASAL DRYNESS: Status: ACTIVE | Noted: 2024-04-05

## 2024-04-05 PROBLEM — Z20.822 CONTACT WITH AND (SUSPECTED) EXPOSURE TO COVID-19: Status: ACTIVE | Noted: 2023-03-02

## 2024-04-05 PROBLEM — M70.72 ILIOPSOAS BURSITIS OF LEFT HIP: Status: ACTIVE | Noted: 2019-12-30

## 2024-04-05 PROBLEM — R97.20 HIGH PROSTATE SPECIFIC ANTIGEN (PSA): Status: ACTIVE | Noted: 2023-01-06

## 2024-04-05 PROBLEM — R50.9 SUBJECTIVE FEVER: Status: ACTIVE | Noted: 2024-04-05

## 2024-04-05 PROBLEM — R04.0 EPISTAXIS: Status: ACTIVE | Noted: 2024-04-05

## 2024-04-05 PROCEDURE — 1157F ADVNC CARE PLAN IN RCRD: CPT | Performed by: NURSE PRACTITIONER

## 2024-04-05 PROCEDURE — 3078F DIAST BP <80 MM HG: CPT | Performed by: NURSE PRACTITIONER

## 2024-04-05 PROCEDURE — 99214 OFFICE O/P EST MOD 30 MIN: CPT | Performed by: NURSE PRACTITIONER

## 2024-04-05 PROCEDURE — 1160F RVW MEDS BY RX/DR IN RCRD: CPT | Performed by: NURSE PRACTITIONER

## 2024-04-05 PROCEDURE — 3074F SYST BP LT 130 MM HG: CPT | Performed by: NURSE PRACTITIONER

## 2024-04-05 PROCEDURE — 1170F FXNL STATUS ASSESSED: CPT | Performed by: NURSE PRACTITIONER

## 2024-04-05 PROCEDURE — G0439 PPPS, SUBSEQ VISIT: HCPCS | Performed by: NURSE PRACTITIONER

## 2024-04-05 PROCEDURE — G0444 DEPRESSION SCREEN ANNUAL: HCPCS | Performed by: NURSE PRACTITIONER

## 2024-04-05 PROCEDURE — 1159F MED LIST DOCD IN RCRD: CPT | Performed by: NURSE PRACTITIONER

## 2024-04-05 PROCEDURE — 99497 ADVNCD CARE PLAN 30 MIN: CPT | Performed by: NURSE PRACTITIONER

## 2024-04-05 RX ORDER — METOPROLOL SUCCINATE 25 MG/1
25 TABLET, EXTENDED RELEASE ORAL DAILY
Qty: 90 TABLET | Refills: 3 | Status: CANCELLED | OUTPATIENT
Start: 2024-04-05

## 2024-04-05 RX ORDER — TAMSULOSIN HYDROCHLORIDE 0.4 MG/1
0.4 CAPSULE ORAL DAILY
Qty: 90 CAPSULE | Refills: 3 | Status: CANCELLED | OUTPATIENT
Start: 2024-04-05

## 2024-04-05 ASSESSMENT — ANXIETY QUESTIONNAIRES
GAD7 TOTAL SCORE: 0
1. FEELING NERVOUS, ANXIOUS, OR ON EDGE: NOT AT ALL
7. FEELING AFRAID AS IF SOMETHING AWFUL MIGHT HAPPEN: NOT AT ALL
IF YOU CHECKED OFF ANY PROBLEMS ON THIS QUESTIONNAIRE, HOW DIFFICULT HAVE THESE PROBLEMS MADE IT FOR YOU TO DO YOUR WORK, TAKE CARE OF THINGS AT HOME, OR GET ALONG WITH OTHER PEOPLE: NOT DIFFICULT AT ALL
4. TROUBLE RELAXING: NOT AT ALL
3. WORRYING TOO MUCH ABOUT DIFFERENT THINGS: NOT AT ALL
2. NOT BEING ABLE TO STOP OR CONTROL WORRYING: NOT AT ALL
6. BECOMING EASILY ANNOYED OR IRRITABLE: NOT AT ALL
5. BEING SO RESTLESS THAT IT IS HARD TO SIT STILL: NOT AT ALL

## 2024-04-05 ASSESSMENT — ACTIVITIES OF DAILY LIVING (ADL)
TAKING_MEDICATION: INDEPENDENT
DOING_HOUSEWORK: INDEPENDENT
GROCERY_SHOPPING: INDEPENDENT
BATHING: INDEPENDENT
DRESSING: INDEPENDENT
MANAGING_FINANCES: INDEPENDENT

## 2024-04-05 ASSESSMENT — ENCOUNTER SYMPTOMS
OCCASIONAL FEELINGS OF UNSTEADINESS: 0
LOSS OF SENSATION IN FEET: 0
DEPRESSION: 0

## 2024-04-05 ASSESSMENT — PATIENT HEALTH QUESTIONNAIRE - PHQ9
SUM OF ALL RESPONSES TO PHQ9 QUESTIONS 1 AND 2: 0
2. FEELING DOWN, DEPRESSED OR HOPELESS: NOT AT ALL
1. LITTLE INTEREST OR PLEASURE IN DOING THINGS: NOT AT ALL

## 2024-04-05 NOTE — PROGRESS NOTES
"Subjective   Reason for Visit: Jose Antonio Rubio is an 79 y.o. male here for a Medicare Wellness visit.     Past Medical, Surgical, and Family History reviewed and updated in chart.    Reviewed all medications by prescribing practitioner or clinical pharmacist (such as prescriptions, OTCs, herbal therapies and supplements) and documented in the medical record.    Patient is accompanied by his wife and also following up for lab results review and management of multiple chronic diseases.  His lab results shows elevated hemoglobin A1c at 5.7%, slightly elevated serum vitamin B12 level at 1055, and PSA at 16.78 which is slightly down from 17.22.  He is currently under the care of urology for management of elevated PSA.  Reports he is doing great and denies acute medical complaint.        Patient Care Team:  EVER Sylvester-CNP as PCP - General (Family Medicine)  Hua Velazquez MD as PCP - [employer] PCP     Review of Systems   All other systems reviewed and are negative.      Objective   Vitals:  /78   Pulse 65   Resp 16   Ht 1.702 m (5' 7\")   Wt 75.2 kg (165 lb 12.8 oz)   SpO2 96%   BMI 25.97 kg/m²       Physical Exam  Vitals reviewed.   Constitutional:       Appearance: Normal appearance.   HENT:      Head: Normocephalic and atraumatic.      Right Ear: External ear normal.      Left Ear: External ear normal.      Nose: Nose normal.      Mouth/Throat:      Mouth: Mucous membranes are moist.   Cardiovascular:      Rate and Rhythm: Normal rate and regular rhythm.      Pulses: Normal pulses.      Heart sounds: Normal heart sounds.   Pulmonary:      Effort: Pulmonary effort is normal.      Breath sounds: Normal breath sounds.   Abdominal:      General: Abdomen is flat. Bowel sounds are normal.      Palpations: Abdomen is soft.   Musculoskeletal:      Cervical back: Neck supple.   Skin:     General: Skin is warm and dry.   Neurological:      General: No focal deficit present.      Mental " Status: He is alert and oriented to person, place, and time.   Psychiatric:         Mood and Affect: Mood normal.         Behavior: Behavior normal.         Thought Content: Thought content normal.         Judgment: Judgment normal.         Assessment/Plan   Problem List Items Addressed This Visit       BPH (benign prostatic hyperplasia)    Dyslipidemia    Hypertension    Routine general medical examination at health care facility - Primary    CAD (coronary artery disease)

## 2024-04-05 NOTE — PATIENT INSTRUCTIONS
Continue taking all current medications as prescribed and complete labs a few days prior to 6 months.

## 2024-04-08 PROBLEM — R97.20 ELEVATED PSA: Status: RESOLVED | Noted: 2023-01-31 | Resolved: 2024-04-08

## 2024-04-08 PROBLEM — R73.03 PREDIABETES: Status: ACTIVE | Noted: 2024-04-08

## 2024-04-09 RX ORDER — ROSUVASTATIN CALCIUM 20 MG/1
20 TABLET, COATED ORAL DAILY
Qty: 90 TABLET | Refills: 3 | Status: SHIPPED | OUTPATIENT
Start: 2024-04-09 | End: 2024-05-16

## 2024-04-25 ENCOUNTER — LAB (OUTPATIENT)
Dept: LAB | Facility: LAB | Age: 79
End: 2024-04-25
Payer: MEDICARE

## 2024-04-25 DIAGNOSIS — R97.20 ELEVATED PSA: Primary | ICD-10-CM

## 2024-05-16 ENCOUNTER — TELEPHONE (OUTPATIENT)
Dept: CARDIOLOGY | Facility: CLINIC | Age: 79
End: 2024-05-16
Payer: MEDICARE

## 2024-05-16 DIAGNOSIS — E78.5 DYSLIPIDEMIA: ICD-10-CM

## 2024-05-16 RX ORDER — ROSUVASTATIN CALCIUM 10 MG/1
10 TABLET, COATED ORAL DAILY
Qty: 90 TABLET | Refills: 1 | Status: SHIPPED | OUTPATIENT
Start: 2024-05-16

## 2024-05-16 NOTE — TELEPHONE ENCOUNTER
5/16/24  1024  Returned call to patient who inquired if he needed additional blood work before his appt with Dr. Luis,  If he could reduce his Crestor dose due to some muscle pains and when he should stop his Plavix.    After giving latest lipid results, informed patient that according to Dr. Diamond' office note in Dec he could stop taking his Plavix in March 2024.  Informed patient he would ask Dr. Luis about reducing his Crestor dose further since his lipid panel was good.    Patient verbalized understanding.        ----- Message from Therese Gonzalez sent at 5/16/2024  8:22 AM EDT -----  Patient left a voicemail asking for you to call him.  Please do.

## 2024-05-16 NOTE — TELEPHONE ENCOUNTER
5/16/24  1423  Called and informed patient of dose reduction in Crestor. Patient agreeable.    New dose change sent for approval and will go to [facility] per patient request.      ----- Message from Franco Luis DO sent at 5/16/2024 10:38 AM EDT -----  Can reduce rosuvastatin to 10 mg daily.  ----- Message -----  From: Huang Smith RN  Sent: 5/16/2024  10:33 AM EDT  To: Franco Luis DO    Talked to patient today. Myalgia better since being on Crestor, but not all the way gone. Wanted to know if his dose of Crestor could be reduced.

## 2024-05-20 DIAGNOSIS — R97.20 ELEVATED PSA: Primary | ICD-10-CM

## 2024-05-23 ENCOUNTER — LAB (OUTPATIENT)
Dept: LAB | Facility: LAB | Age: 79
End: 2024-05-23
Payer: MEDICARE

## 2024-05-23 DIAGNOSIS — R97.20 ELEVATED PSA: ICD-10-CM

## 2024-05-23 LAB — PSA SERPL-MCNC: 15.41 NG/ML

## 2024-05-23 PROCEDURE — 36415 COLL VENOUS BLD VENIPUNCTURE: CPT

## 2024-05-23 PROCEDURE — 84153 ASSAY OF PSA TOTAL: CPT

## 2024-05-30 ENCOUNTER — OFFICE VISIT (OUTPATIENT)
Dept: UROLOGY | Facility: CLINIC | Age: 79
End: 2024-05-30
Payer: MEDICARE

## 2024-05-30 DIAGNOSIS — N40.1 BENIGN PROSTATIC HYPERPLASIA WITH LOWER URINARY TRACT SYMPTOMS, SYMPTOM DETAILS UNSPECIFIED: ICD-10-CM

## 2024-05-30 DIAGNOSIS — R97.20 ELEVATED PSA: Primary | ICD-10-CM

## 2024-05-30 LAB
POC BILIRUBIN, URINE: NEGATIVE
POC BLOOD, URINE: NEGATIVE
POC GLUCOSE, URINE: NEGATIVE MG/DL
POC KETONES, URINE: NEGATIVE MG/DL
POC LEUKOCYTES, URINE: NEGATIVE
POC NITRITE,URINE: NEGATIVE
POC PH, URINE: 6 PH
POC PROTEIN, URINE: NEGATIVE MG/DL
POC SPECIFIC GRAVITY, URINE: 1.01
POC UROBILINOGEN, URINE: 0.2 EU/DL

## 2024-05-30 PROCEDURE — 1159F MED LIST DOCD IN RCRD: CPT | Performed by: UROLOGY

## 2024-05-30 PROCEDURE — 99213 OFFICE O/P EST LOW 20 MIN: CPT | Performed by: UROLOGY

## 2024-05-30 PROCEDURE — 1160F RVW MEDS BY RX/DR IN RCRD: CPT | Performed by: UROLOGY

## 2024-05-30 PROCEDURE — 81003 URINALYSIS AUTO W/O SCOPE: CPT | Performed by: UROLOGY

## 2024-05-30 PROCEDURE — 1157F ADVNC CARE PLAN IN RCRD: CPT | Performed by: UROLOGY

## 2024-05-30 NOTE — PROGRESS NOTES
05/30/2024  Voiding well, no weight loss no bone pain    Patient has no nausea, no vomiting, no fever.    RAQUEL: Deferred    PSA elevated 15.41 but stable    We discussed elevated PSA negative prostate biopsy numerous time  We discussed 6-month monitoring PSA  We discussed benign prostate hypertrophy with mild voiding symptom  All the questions were answered, the patient expressed understanding and agreed to the plan.    Impression  Elevated PSA, negative biopsy x 4  Benign prostate hypertrophy     Plan  Reassurance given  Repeat PSA in 6 months, 1 year  Appointment in 1 year    Chief Complaint   Patient presents with    Elevated PSA     Patient here for 3 month PSA check. He is voiding well.         Physical Exam     TODAYS LAB RESULTS:    PSA 05/23/2024  15.41  PSA 04/02/2024  16.78    POC Glucose, Urine  NEGATIVE mg/dl NEGATIVE   POC Bilirubin, Urine  NEGATIVE NEGATIVE   POC Ketones, Urine  NEGATIVE mg/dl NEGATIVE   POC Specific Gravity, Urine  1.005 - 1.035 1.010   POC Blood, Urine  NEGATIVE NEGATIVE   POC PH, Urine  No Reference Range Established PH 6.0   POC Protein, Urine  NEGATIVE, 30 (1+) mg/dl NEGATIVE   POC Urobilinogen, Urine  0.2, 1.0 EU/DL 0.2   Poc Nitrite, Urine  NEGATIVE NEGATIVE   POC Leukocytes, Urine  NEGATIVE NEGATIVE     ASSESSMENT&PLAN:      IMPRESSIONS:         02/29/2024  Patient has a long history of elevated PSA, negative prostate biopsy x 4.     Voiding well     RAQUEL: Deferred     PSA 17     We had a long discussed about elevated PSA.  Negative prostate biopsy x 4,  We discussed repeat prostate biopsy versus continued monitoring PSA  All the questions were answered, the patient expressed understanding and agreed to the plan.     Impression  Elevated PSA  Benign prostate hypertrophy     Plan  Repeat PSA in 3 months  Appointment in 3 months  Possible fifth prostate biopsy if PSA more than 20 (patient is very anxious)  Reassurance             Chief Complaint   Patient presents with    Elevated  PSA       Patient here today for follow up due to elevated psa, previous patient of Dr. Guallpa.   Patient voiding well, nocturia x 2     PSA 1/15/24 17.00  PSA 2/22/24 17.22     Last Visit Plan:  Repeat PSA 4-6 weeks  For persistent elevation patient would want repeat prostate MR. Bee given good functional status, ECOG 0-1             Physical Exam      TODAYS LAB RESULTS:     No urine sample given today.     ASSESSMENT&PLAN:        IMPRESSIONS:     1/24/23-Dr. Guallpa  Established  1. Elevated PSA  TRUS bx 1/6/23 13/13 cores negative for malignancy   MRI 12/8/22 PI-RADS 3 lesion transition zone 1.6 cm; 62 g; PSAD 0.16  PSA 9.96 10/2022  PSA 8.36 3/2022  PSA 9.34 10/2021  Confirm MDX 2018 negative   Negative TRUS 2018  Father with prostate cancer history           03/17/2022  Voiding well on Flomax 0.4 mg daily nocturia 1-2     Patient has no nausea, no vomiting, no fever.     Patient here today for annual follow up BPH and elevated PSA.     Denies urinary frequency, urgency and nocturia. Slow stream with first void of the morning.     Taking Flomax 0.4 QHS and will need refill for 90 day supply.     RAQUEL: 2+, no nodule     PSA 3/9/22 was 8.36. Stable      IO Glucose - Urine Negative REQUIRED    IO Bilirubin Negative REQUIRED    IO Ketones Negative REQUIRED    IO Specific Gravity 1.020 REQUIRED    IO Blood Negative REQUIRED    IO pH 7.0 REQUIRED    IO Protein, Urine Negative REQUIRED    IO Urobilinogen Normal (0.2-1.0 mg/dl) REQUIRED    IO Nitrite, Urine Negative REQUIRED    IO Leukocytes Negative REQUIRED      We discussed elevated PSA, stable, negative prostate biopsy x3  We discussed benign prostate hypertrophy with mild to moderate voiding symptoms on Flomax 0.4 mg daily  All the questions were answered, the patient expressed understanding and agreed to the plan.     Impression  Elevated PSA, negative prostate biopsy x3  BPH  Dysuria  Nocturia     Plan  Continue Flomax 0.4 mg daily  Monitor PSA  Yearly RAQUEL  and PSA        3/11/2021 HBM  76-year-old male who is here today for follow-up of elevated PSA. Patient is addition has bladder outlet obstruction is been taking tamsulosin states that he is voiding without difficulty. He is had 3 negative transrectal ultrasound and biopsies of the prostate in the past his most recent PSA was 10.75 in February 2021 his prior PSA 6 months ago was greater than 11. He denies any blood or burning on urination. His urinalysis today was completely within normal limits.        PSA   05/23/2024  15.41  04/02/2024  16.78  2/22/24 17.22  1/15/2024 17.00  5/15/2023 10.64  10/10/2022 9.96  9/7/22 12.49  3/9/22 8.36.  3/2021 10.75      Surgery  1/6/23 prostate biopsy negative  Prostate biopsy (-)3 times before

## 2024-06-06 NOTE — PROGRESS NOTES
Formerly [facility] V[facility] Cardiology    Patient Name: Jose Antonio Rubio  Patient : 1945      Scribe Attestation  By signing my name below, IVicky Scribe   attest that this documentation has been prepared under the direction and in the presence of Franco Luis DO.    Reason for visit:  This is an 79-year-old male here for follow-up regarding coronary artery disease status post PCI of his OM done in 2023, hypertension, dyslipidemia with reported statin intolerance.     HPI:  This is an 79-year-old male here for follow-up regarding coronary artery disease status post PCI of his OM done in 2023, hypertension, dyslipidemia with reported statin intolerance.  The patient was last evaluated by me in 2023.  At that visit I asked that he hold atorvastatin for 1 week and then restart at 40 mg daily.  Patient was subsequently transitioned to rosuvastatin with some improvement of his myalgias.  Most recently rosuvastatin was reduced to 10 mg daily after his recent lab work.  CMP done in 2024 showed normal serum sodium and potassium with a serum creatinine of 0.87, normal ALT/AST, hemoglobin A1c was 5.7%, TSH was 2.02, CBC showed hemoglobin of 15.5.  Lipid panel done 2024 showed an LDL cholesterol of 47 and triglycerides of 104 while on rosuvastatin 20 mg daily now reduced to 10 mg daily. ECG done today showed sinus rhythm with a heart rate of 65 bpm. The patient reports that he has been feeling generally well from the cardiac standpoint. He denies any new chest pain, shortness of breath, palpitations and lightheadedness. He reports that he will undergo dental implants in the future. He states that myalgia improved with reduced dose of rosuvastatin. He states that he takes all of his medications as prescribed. During my exam, he was resting comfortably on the exam table.            Assessment/Plan:   1. Coronary artery disease  The patient has a history of  coronary artery disease status post PCI of his OM done in March 2023.  ECG done today showed sinus rhythm with a heart rate of 65 bpm.   He denies anginal chest discomfort.  Blood pressure appears controlled on exam today.  He should continue his current antihypertensive medications and antiplatelet therapy.  Echocardiogram done 2/28/2023 showed normal left ventricular systolic function with an ejection fraction of 55 to 60%, normal right ventricular systolic function, no significant valve abnormalities.  Recent lab works as noted in the HPI.  Lipid panel done 4/2/2024 showed an LDL cholesterol of 47 and triglycerides of 104 while on rosuvastatin 20 mg daily now reduced to 10 mg daily.  Lab works as noted below will be done in 6 months prior to his next visit.   Please see lifestyle recommendations below.  Follow up in 6 months and sooner if necessary.      2. Hypertension  Patient has a history of hypertension which appears controlled on exam today.  He should continue his current antihypertensive medications.      3. Dyslipidemia/statin intolerance.  Lipid panel done 4/2/2024 showed an LDL cholesterol of 47 and triglycerides of 104 while on rosuvastatin 20 mg daily now reduced to 10 mg daily.   He states that myalgia improved with reduced dose of rosuvastatin.   Please see lifestyle recommendations below.     4. Preoperative cardiovascular examination  The patient is being evaluated for dental surgery/implant placement although it has not been scheduled yet. He does have a history of coronary artery disease status post PCI of his OM done in March 2023. He denies a history of ischemic cardiovascular  heart failure, ischemic cerebrovascular disease, insulin-dependent diabetes mellitus, or significant renal dysfunction.     RCRI of 1 based on history placing him at low risk of perioperative MACE.    I do not believe any additional preoperative testing is necessary at this time. Patient should continue his current  cardiac medications perioperatively. The patient can hold clopidogrel and aspirin for 5-7 days prior to the procedure Patient may discontinue clopidogrel after his surgery. He should restart aspirin post op depending on the disposition of the surgical service. Overall, the patient appears to be low  risk for perioperative MACE. The patient expressed understanding of his risk for perioperative cardiovascular complications.         Orders:   Preoperative cardiovascular examination  BMP/CBC/magnesium in 6 months,   Follow-up in 6 months.    Lifestyle Recommendations  I recommend a whole-food plant-based diet, an eating pattern that encourages the consumption of unrefined plant foods (such as fruits, vegetables, tubers, whole grains, legumes, nuts and seeds) and discourages meats, dairy products, eggs and processed foods.     The AHA/ACC recommends that the patient consume a dietary pattern that emphasizes intake of vegetables, fruits, and whole grains; includes low-fat dairy products, poultry, fish, legumes, non-tropical vegetable oils, and nuts; and limits intake of sodium, sweets, sugar-sweetened beverages, and red meats.  Adapt this dietary pattern to appropriate calorie requirements (a 500-750 kcal/day deficit to loose weight), personal and cultural food preferences, and nutrition therapy for other medical conditions (including diabetes).  Achieve this pattern by following plans such as the [place] Mediterranean, DASH dietary pattern, or AHA diet.     Engage in 2 hours and 30 minutes per week of moderate-intensity physical activity, or 1 hour and 15 minutes (75 minutes) per week of vigorous-intensity aerobic physical activity, or an equivalent combination of moderate and vigorous-intensity aerobic physical activity. Aerobic activity should be performed in episodes of at least 10 minutes preferably spread throughout the week.     Adhering to a heart healthy diet, regular exercise habits, avoidance of tobacco products,  and maintenance of a healthy weight are crucial components of their heart disease risk reduction.     Any positive review of systems not specifically addressed in the office visit today should be evaluated and treated by the patients primary care physician or in an emergency department if necessary     Patient was notified that results from ordered tests will be called to the patient if it changes current management; it will otherwise be discussed at a future appointment and available on [facility].     Thank you for allowing me to participate in the care of this patient.        This document was generated using the assistance of voice recognition software. If there are any errors of spelling, grammar, syntax, or meaning; please feel free to contact me directly for clarification.    Past Medical History:  He has a past medical history of Arthropathy, unspecified, Hematospermia, Personal history of other diseases of male genital organs, Personal history of other specified conditions, Sinus congestion (01/31/2023), and Subjective fever (01/31/2023).    Past Surgical History:  He has a past surgical history that includes Hernia repair; Joint replacement; Hernia repair (05/18/2017); and Hip surgery (05/18/2017).      Social History:  He reports that he has never smoked. He has never used smokeless tobacco. He reports that he does not currently use alcohol. He reports that he does not use drugs.    Family History:  No family history on file.     Allergies:  Patient has no known allergies.    Outpatient Medications:  Current Outpatient Medications   Medication Instructions    acetaminophen (TYLENOL) 1,000 mg, oral, Daily RT    allopurinol (Zyloprim) 300 mg tablet Take one tablet every other day    aspirin 81 mg, oral, Daily    cholecalciferol (Vitamin D-3) 5,000 Units tablet 2 tablets, oral, Daily    clopidogrel (PLAVIX) 75 mg, oral, Daily    fish oil concentrate (Omega-3) 120-180 mg capsule 1 g, oral, 2 times daily     "Lactobacillus acidoph-L.justin 1 million cell tablet tablet 1 tablet, oral, Daily    magnesium oxide (Mag-Ox) 200 mg split tablet oral    meclizine (ANTIVERT) 25 mg, oral, Every 6 hours    methocarbamol (ROBAXIN-750) 750 mg, oral, 3 times daily    metoprolol succinate XL (TOPROL-XL) 25 mg, oral, Daily, Do not crush or chew.    multivitamin tablet 1 tablet, oral, Daily    plant stanol cindy 450 mg tablet oral    rosuvastatin (CRESTOR) 10 mg, oral, Daily    tamsulosin (Flomax) 0.4 mg 24 hr capsule TAKE 1 CAPSULE BY MOUTH ONCE  DAILY    vitamin K2, MK-4, 100 mcg tablet 1 tablet, oral, Daily    zinc sulfate 66 mg tablet oral        ROS:  A 14 point review of systems was done and is negative other than as stated in HPI    Vitals:      6/30/2023     1:39 PM 7/14/2023     1:22 PM 9/8/2023    12:50 PM 10/3/2023     1:40 PM 12/29/2023     3:27 PM 2/12/2024    11:06 AM 4/5/2024     1:49 PM   Vitals   Systolic 150   140 126  118   Diastolic 82   68 94  78   Heart Rate 59   69 65  65   Temp  36.1 °C (96.9 °F) 36.6 °C (97.8 °F)       Resp       16   Height (in) 1.727 m (5' 8\") 1.727 m (5' 8\") 1.695 m (5' 6.75\") 1.727 m (5' 8\") 1.702 m (5' 7\") 1.702 m (5' 7\") 1.702 m (5' 7\")   Weight (lb) 167.25 165.7 163.4 165 166.4 166 165.8   BMI 25.43 kg/m2 25.19 kg/m2 25.78 kg/m2 25.09 kg/m2 26.06 kg/m2 26 kg/m2 25.97 kg/m2   BSA (m2) 1.91 m2 1.9 m2 1.87 m2 1.89 m2 1.89 m2 1.89 m2 1.89 m2        Physical Exam:     Constitutional: Cooperative, in no acute distress, alert, appears stated age.   Skin: Skin color, texture, turgor normal. No rashes or lesions.   Head: Normocephalic. No masses, lesions, tenderness or abnormalities   Eyes: Extraocular movements are grossly intact.   Mouth and throat: Mucous membranes moist   Neck: Neck supple, no carotid bruits, no JVD   Respiratory: Lungs clear to auscultation, no wheezing or rhonchi, no use of accessory muscles   Chest wall: No scars, normal excursion with respiration   Cardiovascular: Regular " rhythm without murmur, gallop, or rubs   Gastrointestinal: Abdomen soft, nontender. Bowel sounds normal.   Musculoskeletal: Strength equal in upper extremities   Extremities: Trivial pitting edema   Neurologic: Sensation grossly intact, alert and oriented x3    Intake/Output:   No intake/output data recorded.    Outpatient Medications  Current Outpatient Medications on File Prior to Visit   Medication Sig Dispense Refill    acetaminophen (Tylenol) 500 mg tablet Take 2 tablets (1,000 mg) by mouth once daily.      allopurinol (Zyloprim) 300 mg tablet Take one tablet every other day 45 tablet 1    aspirin 81 mg EC tablet Take 1 tablet (81 mg) by mouth once daily.      cholecalciferol (Vitamin D-3) 5,000 Units tablet Take 2 tablets (10,000 Units) by mouth once daily.      clopidogrel (Plavix) 75 mg tablet Take 1 tablet (75 mg) by mouth once daily. 90 tablet 1    fish oil concentrate (Omega-3) 120-180 mg capsule Take 1 capsule (1 g) by mouth twice a day.      Lactobacillus acidoph-L.bulgar 1 million cell tablet tablet Take 1 tablet by mouth once daily.      magnesium oxide (Mag-Ox) 200 mg split tablet Take by mouth.      meclizine (Antivert) 25 mg tablet Take 1 tablet (25 mg) by mouth every 6 hours.      methocarbamol (Robaxin-750) 750 mg tablet Take 1 tablet (750 mg) by mouth 3 times a day for 10 days. 30 tablet 0    metoprolol succinate XL (Toprol-XL) 25 mg 24 hr tablet Take 1 tablet (25 mg) by mouth once daily. Do not crush or chew. 90 tablet 1    multivitamin tablet Take 1 tablet by mouth once daily.      plant stanol cindy 450 mg tablet Take by mouth.      rosuvastatin (Crestor) 10 mg tablet Take 1 tablet (10 mg) by mouth once daily. 90 tablet 1    tamsulosin (Flomax) 0.4 mg 24 hr capsule TAKE 1 CAPSULE BY MOUTH ONCE  DAILY 90 capsule 3    vitamin K2, MK-4, 100 mcg tablet Take 1 tablet by mouth in the morning.      zinc sulfate 66 mg tablet Take by mouth.       No current facility-administered medications on file  prior to visit.       Labs: (past 26 weeks)  Recent Results (from the past 4368 hour(s))   Basic Metabolic Panel    Collection Time: 12/22/23 11:13 AM   Result Value Ref Range    Glucose 99 74 - 99 mg/dL    Sodium 142 136 - 145 mmol/L    Potassium 4.5 3.5 - 5.3 mmol/L    Chloride 106 98 - 107 mmol/L    Bicarbonate 28 21 - 32 mmol/L    Anion Gap 13 10 - 20 mmol/L    Urea Nitrogen 21 6 - 23 mg/dL    Creatinine 0.91 0.50 - 1.30 mg/dL    eGFR 85 >60 mL/min/1.73m*2    Calcium 10.0 8.6 - 10.3 mg/dL   CBC    Collection Time: 12/22/23 11:13 AM   Result Value Ref Range    WBC 7.5 4.4 - 11.3 x10*3/uL    nRBC 0.0 0.0 - 0.0 /100 WBCs    RBC 5.08 4.50 - 5.90 x10*6/uL    Hemoglobin 15.9 13.5 - 17.5 g/dL    Hematocrit 48.8 41.0 - 52.0 %    MCV 96 80 - 100 fL    MCH 31.3 26.0 - 34.0 pg    MCHC 32.6 32.0 - 36.0 g/dL    RDW 13.2 11.5 - 14.5 %    Platelets 234 150 - 450 x10*3/uL   Magnesium    Collection Time: 12/22/23 11:13 AM   Result Value Ref Range    Magnesium 2.20 1.60 - 2.40 mg/dL   Hemoglobin A1C    Collection Time: 01/15/24 12:47 PM   Result Value Ref Range    Hemoglobin A1C 5.3 see below %    Estimated Average Glucose 105 Not Established mg/dL   Basic metabolic panel    Collection Time: 01/15/24 12:47 PM   Result Value Ref Range    Glucose 100 (H) 74 - 99 mg/dL    Sodium 141 136 - 145 mmol/L    Potassium 4.4 3.5 - 5.3 mmol/L    Chloride 104 98 - 107 mmol/L    Bicarbonate 29 21 - 32 mmol/L    Anion Gap 12 10 - 20 mmol/L    Urea Nitrogen 18 6 - 23 mg/dL    Creatinine 0.89 0.50 - 1.30 mg/dL    eGFR 87 >60 mL/min/1.73m*2    Calcium 10.4 (H) 8.6 - 10.3 mg/dL   Vitamin B12    Collection Time: 01/15/24 12:47 PM   Result Value Ref Range    Vitamin B12 1,357 (H) 211 - 911 pg/mL   PSA    Collection Time: 01/15/24 12:47 PM   Result Value Ref Range    Prostate Specific AG 17.00 (H) <=4.00 ng/mL   PSA    Collection Time: 02/22/24  2:21 PM   Result Value Ref Range    Prostate Specific AG 17.22 (H) <=4.00 ng/mL   POCT UA Automated manually  resulted    Collection Time: 02/29/24  2:05 PM   Result Value Ref Range    POC Glucose, Urine NEGATIVE NEGATIVE mg/dl    POC Bilirubin, Urine NEGATIVE NEGATIVE    POC Ketones, Urine NEGATIVE NEGATIVE mg/dl    POC Specific Gravity, Urine 1.020 1.005 - 1.035    POC Blood, Urine NEGATIVE NEGATIVE    POC PH, Urine 6.5 No Reference Range Established PH    POC Protein, Urine NEGATIVE NEGATIVE, 30 (1+) mg/dl    POC Urobilinogen, Urine 0.2 0.2, 1.0 EU/DL    Poc Nitrite, Urine NEGATIVE NEGATIVE    POC Leukocytes, Urine NEGATIVE NEGATIVE   CBC    Collection Time: 04/02/24 11:16 AM   Result Value Ref Range    WBC 8.4 4.4 - 11.3 x10*3/uL    nRBC 0.0 0.0 - 0.0 /100 WBCs    RBC 5.21 4.50 - 5.90 x10*6/uL    Hemoglobin 15.5 13.5 - 17.5 g/dL    Hematocrit 48.9 41.0 - 52.0 %    MCV 94 80 - 100 fL    MCH 29.8 26.0 - 34.0 pg    MCHC 31.7 (L) 32.0 - 36.0 g/dL    RDW 13.7 11.5 - 14.5 %    Platelets 270 150 - 450 x10*3/uL   Comprehensive Metabolic Panel    Collection Time: 04/02/24 11:16 AM   Result Value Ref Range    Glucose 88 74 - 99 mg/dL    Sodium 142 136 - 145 mmol/L    Potassium 4.6 3.5 - 5.3 mmol/L    Chloride 106 98 - 107 mmol/L    Bicarbonate 29 21 - 32 mmol/L    Anion Gap 12 10 - 20 mmol/L    Urea Nitrogen 19 6 - 23 mg/dL    Creatinine 0.87 0.50 - 1.30 mg/dL    eGFR 87 >60 mL/min/1.73m*2    Calcium 9.7 8.6 - 10.3 mg/dL    Albumin 4.5 3.4 - 5.0 g/dL    Alkaline Phosphatase 52 33 - 136 U/L    Total Protein 7.1 6.4 - 8.2 g/dL    AST 15 9 - 39 U/L    Bilirubin, Total 0.6 0.0 - 1.2 mg/dL    ALT 15 10 - 52 U/L   Lipid panel    Collection Time: 04/02/24 11:16 AM   Result Value Ref Range    Cholesterol 115 0 - 199 mg/dL    HDL-Cholesterol 47.0 mg/dL    Cholesterol/HDL Ratio 2.4     LDL Calculated 47 <=99 mg/dL    VLDL 21 0 - 40 mg/dL    Triglycerides 104 0 - 149 mg/dL    Non HDL Cholesterol 68 0 - 149 mg/dL   TSH with reflex to Free T4 if abnormal    Collection Time: 04/02/24 11:16 AM   Result Value Ref Range    Thyroid Stimulating  Hormone 2.02 0.44 - 3.98 mIU/L   Uric acid    Collection Time: 04/02/24 11:16 AM   Result Value Ref Range    Uric Acid 5.4 4.0 - 7.5 mg/dL   Vitamin D 25-Hydroxy,Total (for eval of Vitamin D levels)    Collection Time: 04/02/24 11:16 AM   Result Value Ref Range    Vitamin D, 25-Hydroxy, Total 81 30 - 100 ng/mL   Vitamin B12    Collection Time: 04/02/24 11:16 AM   Result Value Ref Range    Vitamin B12 1,055 (H) 211 - 911 pg/mL   Hemoglobin A1C    Collection Time: 04/02/24 11:16 AM   Result Value Ref Range    Hemoglobin A1C 5.7 (H) see below %    Estimated Average Glucose 117 Not Established mg/dL   PSA    Collection Time: 04/02/24 11:16 AM   Result Value Ref Range    Prostate Specific AG 16.78 (H) <=4.00 ng/mL   PSA    Collection Time: 05/23/24  1:38 PM   Result Value Ref Range    Prostate Specific AG 15.41 (H) <=4.00 ng/mL   POCT UA Automated manually resulted    Collection Time: 05/30/24  1:05 PM   Result Value Ref Range    POC Glucose, Urine NEGATIVE NEGATIVE mg/dl    POC Bilirubin, Urine NEGATIVE NEGATIVE    POC Ketones, Urine NEGATIVE NEGATIVE mg/dl    POC Specific Gravity, Urine 1.010 1.005 - 1.035    POC Blood, Urine NEGATIVE NEGATIVE    POC PH, Urine 6.0 No Reference Range Established PH    POC Protein, Urine NEGATIVE NEGATIVE, 30 (1+) mg/dl    POC Urobilinogen, Urine 0.2 0.2, 1.0 EU/DL    Poc Nitrite, Urine NEGATIVE NEGATIVE    POC Leukocytes, Urine NEGATIVE NEGATIVE       ECG  Encounter Date: 12/29/23   ECG 12 Lead    Narrative    Sinus rhythm, first-degree AV block, heart rate 65 bpm       Echocardiogram  No results found for this or any previous visit from the past 1095 days.      CV Studies:  EKG:  Encounter Date: 12/29/23   ECG 12 Lead    Narrative    Sinus rhythm, first-degree AV block, heart rate 65 bpm     Echocardiogram:   Echocardiogram     [place] [id number]  Phone [phone] Fax [phone]    TRANSTHORACIC ECHOCARDIOGRAM  REPORT      Patient Name:     RITA HUBER      Reading Physician:   [id number] Franco Luis DO  Study Date:       2/28/2023           Referring Physician: REGINALDO PULIDO  MRN/PID:          [id number]            PCP:  Accession/Order#: [id number]           Department Location: [address]  YOB: 1945           Fellow:  Gender:           M                   Nurse:  Admit Date:       2/27/2023           Sonographer:         Jill Pulido [facility]  Admission Status: Inpatient - Routine Additional Staff:  Height:           172.72 cm           CC Report to:  Weight:           79.83 kg            Study Type:          Echocardiogram  BSA:              1.94 m2  Blood Pressure: 157 /65 mmHg    Diagnosis/ICD: R07.89-Other chest pain  Indication:    Chest Pain  Procedure/CPT: Echo Complete w Full [id number]  Study Detail: The following Echo studies were performed: 2D, M-Mode, Doppler and  color flow.      PHYSICIAN INTERPRETATION:  Left Ventricle: Left ventricular systolic function is normal, with an estimated ejection fraction of 55-60%. There are no regional wall motion abnormalities. The left ventricular cavity size is normal. Spectral Doppler shows a normal pattern of left ventricular diastolic filling.  Left Atrium: The left atrium is normal in size.  Right Ventricle: The right ventricle is normal in size. There is normal right ventricular global systolic function.  Right Atrium: The right atrium is normal in size.  Aortic Valve: The aortic valve is trileaflet. There is trivial aortic valve regurgitation. The peak instantaneous gradient of the aortic valve is 10.9 mmHg. The mean gradient of the aortic valve is 5.0 mmHg.  Mitral Valve: The mitral valve is normal in structure. There is trace mitral valve regurgitation.  Tricuspid Valve: The tricuspid valve is structurally normal. There is trace tricuspid regurgitation.  Pulmonic Valve: The pulmonic valve is structurally normal. There is physiologic pulmonic valve  regurgitation.  Pericardium: There is no pericardial effusion noted.  Aorta: The aortic root is normal.      CONCLUSIONS:  1. Left ventricular systolic function is normal with a 55-60% estimated ejection fraction.    QUANTITATIVE DATA SUMMARY:  2D MEASUREMENTS:  Normal Ranges:  Ao Root d:     2.80 cm   (2.0-3.7cm)  LAs:           4.30 cm   (2.7-4.0cm)  IVSd:          1.15 cm   (0.6-1.1cm)  LVPWd:         1.10 cm   (0.6-1.1cm)  LVIDd:         4.47 cm   (3.9-5.9cm)  LVIDs:         2.31 cm  LV Mass Index: 92.3 g/m2  LV % FS        48.3 %    LA VOLUME:  Normal Ranges:  LA Vol A4C:        32.1 ml    (22+/-6mL/m2)  LA Vol A2C:        34.0 ml  LA Vol BP:         34.4 ml  LA Vol Index A4C:  16.6ml/m2  LA Vol Index A2C:  17.6 ml/m2  LA Vol Index BP:   17.8 ml/m2  LA Area A4C:       13.6 cm2  LA Area A2C:       14.6 cm2  LA Major Axis A4C: 4.9 cm  LA Major Axis A2C: 5.3 cm  LA Volume Index:   16.6 ml/m2    RA VOLUME BY A/L METHOD:  Normal Ranges:  RA Area A4C: 7.9 cm2    M-MODE MEASUREMENTS:  Normal Ranges:  Ao Root: 2.80 cm (2.0-3.7cm)    AORTA MEASUREMENTS:  Normal Ranges:  Asc Ao, d: 3.00 cm (2.1-3.4cm)    LV SYSTOLIC FUNCTION BY 2D PLANIMETRY (MOD):  Normal Ranges:  EF-A4C View: 58.7 % (>=55%)  EF-A2C View: 55.5 %  EF-Biplane:  55.7 %    LV DIASTOLIC FUNCTION:  Normal Ranges:  MV Peak E:    0.53 m/s (0.7-1.2 m/s)  MV Peak A:    1.05 m/s (0.42-0.7 m/s)  E/A Ratio:    0.50     (1.0-2.2)  MV e'         0.10 m/s (>8.0)  MV lateral e' 0.12 m/s  MV medial e'  0.07 m/s  E/e' Ratio:   5.55     (<8.0)    MITRAL VALVE:  Normal Ranges:  MV DT: 411 msec (150-240msec)    AORTIC VALVE:  Normal Ranges:  AoV Vmax:                1.65 m/s  (<=1.7m/s)  AoV Peak PG:             10.9 mmHg (<20mmHg)  AoV Mean P.0 mmHg  (1.7-11.5mmHg)  LVOT Max Kyler:            0.95 m/s  (<=1.1m/s)  AoV VTI:                 31.30 cm  (18-25cm)  LVOT VTI:                19.10 cm  LVOT Diameter:           2.00 cm   (1.8-2.4cm)  AoV Area, VTI:            1.92 cm2  (2.5-5.5cm2)  AoV Area,Vmax:           1.81 cm2  (2.5-4.5cm2)  AoV Dimensionless Index: 0.61    RIGHT VENTRICLE:  RV 1   2.83 cm  RV 2   2.38 cm  RV 3   5.97 cm  TAPSE: 21.9 mm  RV s'  0.16 m/s    TRICUSPID VALVE/RVSP:  Normal Ranges:  TV E Vmax: 0.32 m/s (0.3-0.7m/s)  TV A Vmax: 0.33 m/s  IVC Diam:  1.87 cm      [id number] Franco Luis DO  Electronically signed on 2/28/2023 at 12:11:47 PM         Final     Stress Testing IMGRESULT(VDG8500:1:1825): No results found for this or any previous visit from the past 1825 days.    Cardiac Catheterization:   Adult Cath     [facility], Cath Lab  [address] [place] [address]  Phone [phone] Fax [phone]    Cardiovascular Catheterization Report    Patient Name:     RITA HUBER Performing Physician: Lilly Maldonado MD  Study Date:       3/1/2023       Verifying Physician:  Lilly Maldonado MD  MRN/PID:          [id number]       Cardiologist:  Accession/Order#: [id number]      Referring Physician:  FRANCO LUIS  YOB: 1945      Referring Physician:  Gender:           M              Referring Physician:      Study: Left Heart Catheterization      Indications:  RITA HUBER is a 78 year old male who presents with hypertension and dyslipidemia. New onset angina <=2 months, new onset angina <= 2 Months and other PCI indication - unstable angina , with a chest pain assessment of typical angina. Study performed as an urgent cath procedure.    Medical History:  Stress test performed: No. CTA performed: No. Agatston accessed: No. LVEF Assessed: Yes.    Procedure Description:  After infiltration with 2% Lidocaine, the right radial artery was cannulated with a modified Seldinger technique. Subsequently a 6 Danish sheath was placed in the right radial artery. After infiltration with 2% Lidocaine, a second arterial access was obtained via the right femoral artery with a modified Seldinger technique and a 6 Danish  sheath was placed. This second arterial access was obtained to allow for subclavian artery tortuosity. Selective coronary catheterization was performed using a 6 Fr catheter(s) exchanged over a guide wire to cannulate the coronary arteries. A JL 4 tip catheter was used for left coronary injections. A JR 4 tip catheter was used for right coronary injections.  Multiple injections of contrast were made into the left and right coronary arteries with angiograms recorded in multiple projections. After completion of the procedure, the arterial sheath was pulled and a TR Band Radial Compression Device was utilized to obtain patent hemostasis. Femoral artery angiography was performed at the additional arterial access site. This demonstrated a common femoral artery puncture appropriate for closure. An Angio-Seal Evolution 6F ([employer]) vascular closure device was placed per protocol.    Coronary Angiography:  The coronary circulation is co-dominant.    Left Main Coronary Artery:  The left main coronary artery is a normal caliber vessel. The left main arises normally from the left coronary sinus of Valsalva and bifurcates into the LAD and circumflex coronary arteries. The left main coronary artery showed no significant disease or stenosis greater than 30%.    Left Anterior Descending Coronary Artery Distribution:  The left anterior descending coronary artery is a normal caliber vessel. The LAD arises normally from the left main coronary artery and wraps around the apex of the LV. The LAD demonstrated diffuse mild atherosclerotic disease. The mid left anterior descending coronary artery showed ~ 30-40%. This lesion was mildly calcified. An additional lesion, located at the mid left anterior descending coronary artery, revealed 30% stenosis. This second lesion was calcified. The 1st diagonal branch is a normal caliber vessel. The 1st diagonal branch showed mild atherosclerotic disease.    Circumflex Coronary Artery  Distribution:  The circumflex coronary artery is a large caliber vessel. The circumflex arises normally from the left main coronary artery, giving rise to the first obtuse marginal and supplies the left posterolateral descending artery segment. The circumflex revealed mild atherosclerotic disease. The 1st obtuse marginal branch is a large and branching vessel caliber vessel. The mid 1st obtuse marginal branch showed 95-99%. The left posterior descending artery is a normal caliber vessel. The left posterior descending artery showed mild atherosclerotic disease.    Right Coronary Artery Distribution:    The right coronary artery is a medium-sized caliber vessel. The RCA arises normally from the right sinus of Valsalva and supplies the right posterolateral descending artery. The RCA showed diffuse mild atherosclerotic disease. The right posterior descending artery is a medium-sized caliber vessel. The right posterior descending artery showed mild atherosclerotic disease.    Coronary Interventions:  Angiography reveals a 99% stenosis of the mid 1st obtuse marginal and lower branch of OM1. Pre-intervention MASHA flow was 3. Percutaneous coronary intervention was performed within the mid 1st obtuse marginal and lower branch of OM1. The vessel was pre-dilated using a compliant balloon 2.0 mm x 15 mm at 12 STEPHAN. SYNERGY XD Everolimus drug-eluting stent 2.25 mm x 28 mm was advanced to the lesion and implanted at 12 STEPHAN. The stent was post dilated using a non-compliant balloon 2.5 mm x 15 mm at 16-20 STEPHAN. The stenosis was successfully reduced from 99% to 0%. Post-intervention MASHA flow was 3. Upfront antiplatelets were ASA and clopidogrel. Heparin was given IV to achieve an ACT > 300. 6Fr EBU 3.5 guide catheter was used to engage the LMCA. .014 BMW guidewire was placed into the LCx with distal tip in the upper branch of OM1. A .014 Runthrough wire was used to cross lesion with tip in the distal lower branch of OM1. The lesion  was dilated with SC 2.0 x 15mm balloon at 12 fausto. A Synergy XD  2.25 x 28mm LU was deployed in the lower branch of OM1 at 12 fausto. The stent was postdilated with an NC 2.5mm balloon at 16-18 in the mid-distal portion, and at 20 fausto in the proximal portion. Good angioraphic result with MASHA flow in the vesse, no residual dissection or evidence of distal embolization. Guidwires and guide were withdrawn, and the patient was chest pain free upon transfer to the ICU.    Coronary Lesion Summary:  Vessel   Stenosis   Vessel Segment  LAD      ~ 30-40%        mid  LAD    30% stenosis      mid  OM 1      95-99%         mid      Complications:  No in-lab complications observed.    Cardiac Cath Transition of Care Summary:  Post Procedure           LU of Circumflex.  Diagnosis:  Blood Loss:              Estimated blood loss during the procedure was minimal  mls.  Specimens Removed:       Number of specimen(s) removed: for ACT's.      Recommendations:  Maximize medical therapy.  Agressive risk factor modification efforts.  Telemetry monitoring.  Follow-up with cardiology clinic.  Monitor vitals and arterial access site/pulses.  Anti-platelet therapy with Aspirin and Ticagrelor 90mgs BID.  Lipid lowering agent or Statin therapy.  Consider referral to cardiac rehabilitation.  Beta blocker therapy.  Angiotensin-converting enzyme (ACE) inhibitor for LV systolic dysfunction.    ____________________________________________________________________________________  CONCLUSIONS:  1. Unstable angina s/p successful PCI of lower branch of OM1 culprit lesion.  2. Mild-moderate LAD disease.  3. Mild diffuse RCA disease. Codominant system.    ____________________________________________________________________________________  CPT Codes:  Coronary Angiography S&I only ([facility])([facility])-26929; Revasc during AMI stent/angio/atherc,ins asp Thrombectomy, Left Circumflex single Vessel (PCI)-77506.LC; Moderate Sedation Services initial 15 minutes patient  >5 years-48258; Moderate Sedation Services 1st additional 15 minutes patient >5 years-26531; Moderate Sedation Services 2nd additional 15 minutes patient >5 years-75276; Moderate Sedation Services 3rd additional 15 minutes patient >5 years-08313; Moderate Sedation Services 4th additional 15 minutes patient >5 years-71835    ICD 10 Codes:  I20.0-Unstable angina; R94.30-Abnormal result of cardiovascular function study, unspecified    [id number] Aric Maldonado MD  Performing Physician  Electronically signed by [id number] Aric Maldonado MD on 3/2/2023 at 8:56:00 AM      cc Report to: MARY PURCELL           Final   No results found for this or any previous visit from the past 3650 days.     Cardiac Scoring: No results found for this or any previous visit from the past 1825 days.    AAA : No results found for this or any previous visit from the past 1825 days.    OTHER: No results found for this or any previous visit from the past 1825 days.    LAST IMAGING RESULTS  XR shoulder 2+ views bilateral  Narrative: Interpreted By:  Omid Alexander,   STUDY:  XR SHOULDER 2+ VIEWS BILATERAL;  2/12/2024 11:41 am      INDICATION:  Signs/Symptoms:pain.      COMPARISON:  None.      ACCESSION NUMBER(S):  [id number]      ORDERING CLINICIAN:  LEOLA PETERSON      FINDINGS:  Left shoulder: No acute fracture. Moderate glenohumeral joint space  narrowing with osteophytes. Moderate elevation of the distal clavicle  relative to the acromion with overlying skin tenting.      Right shoulder: No acute fracture or dislocation. Moderate  glenohumeral joint space narrowing with osteophytes. Severe  acromioclavicular joint space narrowing with osteophytes and a 1 cm  joint body superior to the distal clavicle.      Impression: Bilateral moderate glenohumeral osteoarthritis.      Severe right acromioclavicular osteoarthritis.      Left acromioclavicular joint separation ([place] type 5).      MACRO  None      Signed by: Omid Alexander 2/13/2024 9:36 PM  Dictation  workstation:   [id number]    Problem List Items Addressed This Visit       Dyslipidemia    Hypertension    CAD (coronary artery disease) - Primary    Statin intolerance          Franco Luis DO, FACC, FACOI

## 2024-06-10 DIAGNOSIS — I10 PRIMARY HYPERTENSION: ICD-10-CM

## 2024-06-10 DIAGNOSIS — I25.10 CORONARY ARTERY DISEASE INVOLVING NATIVE CORONARY ARTERY OF NATIVE HEART WITHOUT ANGINA PECTORIS: ICD-10-CM

## 2024-06-11 ENCOUNTER — APPOINTMENT (OUTPATIENT)
Dept: CARDIOLOGY | Facility: CLINIC | Age: 79
End: 2024-06-11
Payer: MEDICARE

## 2024-06-11 VITALS
WEIGHT: 165 LBS | HEART RATE: 65 BPM | HEIGHT: 68 IN | DIASTOLIC BLOOD PRESSURE: 82 MMHG | SYSTOLIC BLOOD PRESSURE: 120 MMHG | BODY MASS INDEX: 25.01 KG/M2

## 2024-06-11 DIAGNOSIS — Z01.810 PREOPERATIVE CARDIOVASCULAR EXAMINATION: ICD-10-CM

## 2024-06-11 DIAGNOSIS — Z78.9 STATIN INTOLERANCE: ICD-10-CM

## 2024-06-11 DIAGNOSIS — I25.10 CORONARY ARTERY DISEASE INVOLVING NATIVE CORONARY ARTERY OF NATIVE HEART WITHOUT ANGINA PECTORIS: Primary | ICD-10-CM

## 2024-06-11 DIAGNOSIS — I10 PRIMARY HYPERTENSION: ICD-10-CM

## 2024-06-11 DIAGNOSIS — E78.5 DYSLIPIDEMIA: ICD-10-CM

## 2024-06-11 PROCEDURE — 3079F DIAST BP 80-89 MM HG: CPT | Performed by: INTERNAL MEDICINE

## 2024-06-11 PROCEDURE — 1159F MED LIST DOCD IN RCRD: CPT | Performed by: INTERNAL MEDICINE

## 2024-06-11 PROCEDURE — 3074F SYST BP LT 130 MM HG: CPT | Performed by: INTERNAL MEDICINE

## 2024-06-11 PROCEDURE — 1157F ADVNC CARE PLAN IN RCRD: CPT | Performed by: INTERNAL MEDICINE

## 2024-06-11 PROCEDURE — 1036F TOBACCO NON-USER: CPT | Performed by: INTERNAL MEDICINE

## 2024-06-11 PROCEDURE — 99214 OFFICE O/P EST MOD 30 MIN: CPT | Performed by: INTERNAL MEDICINE

## 2024-06-11 PROCEDURE — 93000 ELECTROCARDIOGRAM COMPLETE: CPT | Performed by: INTERNAL MEDICINE

## 2024-06-11 ASSESSMENT — ENCOUNTER SYMPTOMS
DEPRESSION: 0
OCCASIONAL FEELINGS OF UNSTEADINESS: 0
LOSS OF SENSATION IN FEET: 0

## 2024-06-12 RX ORDER — METOPROLOL SUCCINATE 25 MG/1
25 TABLET, EXTENDED RELEASE ORAL DAILY
Qty: 90 TABLET | Refills: 2 | Status: SHIPPED | OUTPATIENT
Start: 2024-06-12

## 2024-06-22 DIAGNOSIS — Z87.39 HISTORY OF GOUT: ICD-10-CM

## 2024-06-24 RX ORDER — ALLOPURINOL 300 MG/1
TABLET ORAL
Qty: 45 TABLET | Refills: 3 | Status: SHIPPED | OUTPATIENT
Start: 2024-06-24

## 2024-07-02 DIAGNOSIS — I25.10 CORONARY ARTERY DISEASE INVOLVING NATIVE CORONARY ARTERY OF NATIVE HEART WITHOUT ANGINA PECTORIS: ICD-10-CM

## 2024-07-02 DIAGNOSIS — E78.5 DYSLIPIDEMIA: ICD-10-CM

## 2024-07-02 DIAGNOSIS — I10 PRIMARY HYPERTENSION: ICD-10-CM

## 2024-07-09 ENCOUNTER — TELEPHONE (OUTPATIENT)
Dept: UROLOGY | Facility: CLINIC | Age: 79
End: 2024-07-09
Payer: MEDICARE

## 2024-07-09 DIAGNOSIS — N40.0 BENIGN PROSTATIC HYPERPLASIA WITHOUT LOWER URINARY TRACT SYMPTOMS: ICD-10-CM

## 2024-07-09 RX ORDER — TAMSULOSIN HYDROCHLORIDE 0.4 MG/1
0.4 CAPSULE ORAL DAILY
Qty: 90 CAPSULE | Refills: 3 | Status: SHIPPED | OUTPATIENT
Start: 2024-07-09

## 2024-07-09 NOTE — TELEPHONE ENCOUNTER
Patient needs refill on his Tamsulosin to be sent over to the [facility]  #[phone] with 3 refills  Last saw Louis was May, 2024  Thank you

## 2024-07-15 RX ORDER — CLOPIDOGREL BISULFATE 75 MG/1
75 TABLET ORAL DAILY
Qty: 90 TABLET | Refills: 3 | Status: SHIPPED | OUTPATIENT
Start: 2024-07-15

## 2024-10-07 ENCOUNTER — LAB (OUTPATIENT)
Dept: LAB | Facility: LAB | Age: 79
End: 2024-10-07
Payer: MEDICARE

## 2024-10-07 DIAGNOSIS — R73.03 PREDIABETES: ICD-10-CM

## 2024-10-07 DIAGNOSIS — I10 PRIMARY HYPERTENSION: ICD-10-CM

## 2024-10-07 DIAGNOSIS — R79.89 ELEVATED VITAMIN B12 LEVEL: ICD-10-CM

## 2024-10-07 LAB
ANION GAP SERPL CALC-SCNC: 9 MMOL/L (ref 10–20)
BUN SERPL-MCNC: 21 MG/DL (ref 6–23)
CALCIUM SERPL-MCNC: 9.4 MG/DL (ref 8.6–10.3)
CHLORIDE SERPL-SCNC: 108 MMOL/L (ref 98–107)
CO2 SERPL-SCNC: 28 MMOL/L (ref 21–32)
CREAT SERPL-MCNC: 0.88 MG/DL (ref 0.5–1.3)
EGFRCR SERPLBLD CKD-EPI 2021: 86 ML/MIN/1.73M*2
EST. AVERAGE GLUCOSE BLD GHB EST-MCNC: 105 MG/DL
GLUCOSE SERPL-MCNC: 98 MG/DL (ref 74–99)
HBA1C MFR BLD: 5.3 %
POTASSIUM SERPL-SCNC: 4.3 MMOL/L (ref 3.5–5.3)
SODIUM SERPL-SCNC: 141 MMOL/L (ref 136–145)
VIT B12 SERPL-MCNC: 1000 PG/ML (ref 211–911)

## 2024-10-07 PROCEDURE — 36415 COLL VENOUS BLD VENIPUNCTURE: CPT

## 2024-10-07 PROCEDURE — 82607 VITAMIN B-12: CPT

## 2024-10-07 PROCEDURE — 80048 BASIC METABOLIC PNL TOTAL CA: CPT

## 2024-10-07 PROCEDURE — 83036 HEMOGLOBIN GLYCOSYLATED A1C: CPT

## 2024-10-10 ENCOUNTER — APPOINTMENT (OUTPATIENT)
Dept: PRIMARY CARE | Facility: CLINIC | Age: 79
End: 2024-10-10
Payer: MEDICARE

## 2024-10-10 VITALS — DIASTOLIC BLOOD PRESSURE: 76 MMHG | OXYGEN SATURATION: 98 % | SYSTOLIC BLOOD PRESSURE: 144 MMHG | HEART RATE: 58 BPM

## 2024-10-10 DIAGNOSIS — E55.9 VITAMIN D DEFICIENCY: ICD-10-CM

## 2024-10-10 DIAGNOSIS — Z95.5 PRESENCE OF STENT IN CORONARY ARTERY: ICD-10-CM

## 2024-10-10 DIAGNOSIS — I25.10 ARTERIOSCLEROSIS OF CORONARY ARTERY: ICD-10-CM

## 2024-10-10 DIAGNOSIS — R73.03 PREDIABETES: Primary | ICD-10-CM

## 2024-10-10 DIAGNOSIS — E78.5 DYSLIPIDEMIA: ICD-10-CM

## 2024-10-10 DIAGNOSIS — M19.90 ARTHRITIS: ICD-10-CM

## 2024-10-10 DIAGNOSIS — Z87.39 HISTORY OF GOUT: ICD-10-CM

## 2024-10-10 DIAGNOSIS — Z00.00 MEDICARE ANNUAL WELLNESS VISIT, SUBSEQUENT: ICD-10-CM

## 2024-10-10 DIAGNOSIS — I10 PRIMARY HYPERTENSION: ICD-10-CM

## 2024-10-10 DIAGNOSIS — Z23 NEED FOR INFLUENZA VACCINATION: ICD-10-CM

## 2024-10-10 DIAGNOSIS — R79.89 ELEVATED VITAMIN B12 LEVEL: ICD-10-CM

## 2024-10-10 PROBLEM — J34.89 NASAL DRYNESS: Status: RESOLVED | Noted: 2024-04-05 | Resolved: 2024-10-10

## 2024-10-10 PROBLEM — N40.0 BPH (BENIGN PROSTATIC HYPERPLASIA): Status: RESOLVED | Noted: 2023-01-31 | Resolved: 2024-10-10

## 2024-10-10 PROBLEM — R04.0 EPISTAXIS: Status: RESOLVED | Noted: 2024-04-05 | Resolved: 2024-10-10

## 2024-10-10 PROCEDURE — 3077F SYST BP >= 140 MM HG: CPT | Performed by: NURSE PRACTITIONER

## 2024-10-10 PROCEDURE — 1036F TOBACCO NON-USER: CPT | Performed by: NURSE PRACTITIONER

## 2024-10-10 PROCEDURE — 1157F ADVNC CARE PLAN IN RCRD: CPT | Performed by: NURSE PRACTITIONER

## 2024-10-10 PROCEDURE — 99214 OFFICE O/P EST MOD 30 MIN: CPT | Performed by: NURSE PRACTITIONER

## 2024-10-10 PROCEDURE — 3078F DIAST BP <80 MM HG: CPT | Performed by: NURSE PRACTITIONER

## 2024-10-10 PROCEDURE — G0008 ADMIN INFLUENZA VIRUS VAC: HCPCS | Performed by: NURSE PRACTITIONER

## 2024-10-10 PROCEDURE — 90662 IIV NO PRSV INCREASED AG IM: CPT | Performed by: NURSE PRACTITIONER

## 2024-10-10 PROCEDURE — 1159F MED LIST DOCD IN RCRD: CPT | Performed by: NURSE PRACTITIONER

## 2024-10-10 PROCEDURE — 1160F RVW MEDS BY RX/DR IN RCRD: CPT | Performed by: NURSE PRACTITIONER

## 2024-10-10 RX ORDER — ALLOPURINOL 300 MG/1
TABLET ORAL
Qty: 90 TABLET | Refills: 3 | Status: SHIPPED | OUTPATIENT
Start: 2024-10-10

## 2024-10-10 ASSESSMENT — PATIENT HEALTH QUESTIONNAIRE - PHQ9
2. FEELING DOWN, DEPRESSED OR HOPELESS: NOT AT ALL
1. LITTLE INTEREST OR PLEASURE IN DOING THINGS: NOT AT ALL
SUM OF ALL RESPONSES TO PHQ9 QUESTIONS 1 AND 2: 0

## 2024-10-10 ASSESSMENT — COLUMBIA-SUICIDE SEVERITY RATING SCALE - C-SSRS
1. IN THE PAST MONTH, HAVE YOU WISHED YOU WERE DEAD OR WISHED YOU COULD GO TO SLEEP AND NOT WAKE UP?: NO
2. HAVE YOU ACTUALLY HAD ANY THOUGHTS OF KILLING YOURSELF?: NO
6. HAVE YOU EVER DONE ANYTHING, STARTED TO DO ANYTHING, OR PREPARED TO DO ANYTHING TO END YOUR LIFE?: NO

## 2024-10-10 ASSESSMENT — ENCOUNTER SYMPTOMS
OCCASIONAL FEELINGS OF UNSTEADINESS: 0
DEPRESSION: 0
LOSS OF SENSATION IN FEET: 0

## 2024-10-10 NOTE — PATIENT INSTRUCTIONS
Your lab results shows a hemoglobin A1c is excellent at 5.3%.  Your serum vitamin B12 level is slightly elevated at 1000.  Continue taking all current medications as prescribed and complete labs a few days prior to 6-month follow-up for annual Medicare wellness exam.

## 2024-10-10 NOTE — PROGRESS NOTES
Subjective   Patient ID: Jose Antonio Rubio is a 79 y.o. male who presents for Follow-up (Pt here for follow up).    Patient is following up for lab results review and management of multiple chronic diseases.  His lab results are unremarkable with hemoglobin A1c down to 5.3%.  However, his serum vitamin B12 level is slightly elevated at 1000.  Advises he is compliant with his medications with no side effect noted.  Reports feeling great and denies acute medical complaint.         Review of Systems   All other systems reviewed and are negative.      Objective   There were no vitals taken for this visit.    Physical Exam  Constitutional:       Appearance: Normal appearance. He is normal weight.   HENT:      Head: Normocephalic and atraumatic.      Right Ear: External ear normal.      Left Ear: External ear normal.      Nose: Nose normal.      Mouth/Throat:      Mouth: Mucous membranes are moist.   Cardiovascular:      Rate and Rhythm: Normal rate.   Pulmonary:      Effort: Pulmonary effort is normal.   Musculoskeletal:      Cervical back: Neck supple.   Skin:     General: Skin is warm and dry.   Neurological:      General: No focal deficit present.      Mental Status: He is alert and oriented to person, place, and time.   Psychiatric:         Mood and Affect: Mood normal.         Behavior: Behavior normal.         Thought Content: Thought content normal.         Judgment: Judgment normal.         Assessment/Plan   Problem List Items Addressed This Visit       Hypertension

## 2024-10-26 DIAGNOSIS — E78.5 DYSLIPIDEMIA: ICD-10-CM

## 2024-11-18 ENCOUNTER — LAB (OUTPATIENT)
Dept: LAB | Facility: LAB | Age: 79
End: 2024-11-18
Payer: MEDICARE

## 2024-11-18 DIAGNOSIS — Z01.810 PREOPERATIVE CARDIOVASCULAR EXAMINATION: ICD-10-CM

## 2024-11-18 DIAGNOSIS — I10 PRIMARY HYPERTENSION: ICD-10-CM

## 2024-11-18 DIAGNOSIS — E78.5 DYSLIPIDEMIA: ICD-10-CM

## 2024-11-18 DIAGNOSIS — Z78.9 STATIN INTOLERANCE: ICD-10-CM

## 2024-11-18 DIAGNOSIS — I25.10 CORONARY ARTERY DISEASE INVOLVING NATIVE CORONARY ARTERY OF NATIVE HEART WITHOUT ANGINA PECTORIS: ICD-10-CM

## 2024-11-18 DIAGNOSIS — N40.1 BENIGN PROSTATIC HYPERPLASIA WITH LOWER URINARY TRACT SYMPTOMS, SYMPTOM DETAILS UNSPECIFIED: ICD-10-CM

## 2024-11-18 DIAGNOSIS — R97.20 ELEVATED PSA: ICD-10-CM

## 2024-11-18 LAB
ANION GAP SERPL CALC-SCNC: 12 MMOL/L (ref 10–20)
BUN SERPL-MCNC: 22 MG/DL (ref 6–23)
CALCIUM SERPL-MCNC: 9.7 MG/DL (ref 8.6–10.3)
CHLORIDE SERPL-SCNC: 104 MMOL/L (ref 98–107)
CO2 SERPL-SCNC: 29 MMOL/L (ref 21–32)
CREAT SERPL-MCNC: 0.93 MG/DL (ref 0.5–1.3)
EGFRCR SERPLBLD CKD-EPI 2021: 82 ML/MIN/1.73M*2
ERYTHROCYTE [DISTWIDTH] IN BLOOD BY AUTOMATED COUNT: 14.6 % (ref 11.5–14.5)
GLUCOSE SERPL-MCNC: 86 MG/DL (ref 74–99)
HCT VFR BLD AUTO: 48.2 % (ref 41–52)
HGB BLD-MCNC: 16.2 G/DL (ref 13.5–17.5)
MAGNESIUM SERPL-MCNC: 2.33 MG/DL (ref 1.6–2.4)
MCH RBC QN AUTO: 32.9 PG (ref 26–34)
MCHC RBC AUTO-ENTMCNC: 33.6 G/DL (ref 32–36)
MCV RBC AUTO: 98 FL (ref 80–100)
NRBC BLD-RTO: 0 /100 WBCS (ref 0–0)
PLATELET # BLD AUTO: 181 X10*3/UL (ref 150–450)
POTASSIUM SERPL-SCNC: 4.6 MMOL/L (ref 3.5–5.3)
PSA SERPL-MCNC: 14.84 NG/ML
RBC # BLD AUTO: 4.93 X10*6/UL (ref 4.5–5.9)
SODIUM SERPL-SCNC: 140 MMOL/L (ref 136–145)
WBC # BLD AUTO: 7 X10*3/UL (ref 4.4–11.3)

## 2024-11-18 PROCEDURE — 36415 COLL VENOUS BLD VENIPUNCTURE: CPT

## 2024-11-18 PROCEDURE — 83735 ASSAY OF MAGNESIUM: CPT

## 2024-11-18 PROCEDURE — 84153 ASSAY OF PSA TOTAL: CPT

## 2024-11-18 PROCEDURE — 85027 COMPLETE CBC AUTOMATED: CPT

## 2024-11-18 PROCEDURE — 80048 BASIC METABOLIC PNL TOTAL CA: CPT

## 2024-11-21 ENCOUNTER — APPOINTMENT (OUTPATIENT)
Dept: UROLOGY | Facility: CLINIC | Age: 79
End: 2024-11-21
Payer: MEDICARE

## 2024-11-21 VITALS
HEART RATE: 72 BPM | HEIGHT: 68 IN | BODY MASS INDEX: 25.01 KG/M2 | SYSTOLIC BLOOD PRESSURE: 183 MMHG | DIASTOLIC BLOOD PRESSURE: 74 MMHG | WEIGHT: 165 LBS

## 2024-11-21 DIAGNOSIS — R97.20 ELEVATED PSA: ICD-10-CM

## 2024-11-21 DIAGNOSIS — N40.1 BENIGN PROSTATIC HYPERPLASIA WITH LOWER URINARY TRACT SYMPTOMS, SYMPTOM DETAILS UNSPECIFIED: Primary | ICD-10-CM

## 2024-11-21 LAB
POC BILIRUBIN, URINE: NEGATIVE
POC BLOOD, URINE: NEGATIVE
POC GLUCOSE, URINE: NEGATIVE MG/DL
POC KETONES, URINE: NEGATIVE MG/DL
POC LEUKOCYTES, URINE: NEGATIVE
POC NITRITE,URINE: NEGATIVE
POC PH, URINE: 7 PH
POC PROTEIN, URINE: NEGATIVE MG/DL
POC SPECIFIC GRAVITY, URINE: 1.01
POC UROBILINOGEN, URINE: 0.2 EU/DL

## 2024-11-21 PROCEDURE — 81003 URINALYSIS AUTO W/O SCOPE: CPT | Performed by: UROLOGY

## 2024-11-21 PROCEDURE — 1036F TOBACCO NON-USER: CPT | Performed by: UROLOGY

## 2024-11-21 PROCEDURE — 99213 OFFICE O/P EST LOW 20 MIN: CPT | Performed by: UROLOGY

## 2024-11-21 PROCEDURE — 1157F ADVNC CARE PLAN IN RCRD: CPT | Performed by: UROLOGY

## 2024-11-21 PROCEDURE — 3078F DIAST BP <80 MM HG: CPT | Performed by: UROLOGY

## 2024-11-21 PROCEDURE — 3077F SYST BP >= 140 MM HG: CPT | Performed by: UROLOGY

## 2024-11-21 PROCEDURE — 1159F MED LIST DOCD IN RCRD: CPT | Performed by: UROLOGY

## 2024-11-21 NOTE — PROGRESS NOTES
11/21/2024  Voiding well on Flomax 0.4 mg daily    Patient has no nausea, no vomiting, no fever.    RAQUEL: Deferred    PSA   11/18/2024 14.84    We discussed elevated PSA negative prostate biopsy numerous time  We discussed 6-month monitoring PSA  We discussed benign prostate hypertrophy with mild voiding symptom  All the questions were answered, the patient expressed understanding and agreed to the plan.     Impression  Elevated PSA, negative biopsy x 4  Benign prostate hypertrophy     Plan  Watch voiding  PSA in a year  Appointment in a year    Chief Complaint   Patient presents with    Elevated PSA     Patient is here today for 6 month follow up on elevated psa .  He denies any voiding issues at this time.        Physical Exam     TODAYS LAB RESULTS:  Glucose, Urine  NEGATIVE mg/dl NEGATIVE NEGATIVE NEGATIVE   POC Bilirubin, Urine  NEGATIVE NEGATIVE NEGATIVE NEGATIVE   POC Ketones, Urine  NEGATIVE mg/dl NEGATIVE NEGATIVE NEGATIVE   POC Specific Gravity, Urine  1.005 - 1.035 1.010 1.010 1.020   POC Blood, Urine  NEGATIVE NEGATIVE NEGATIVE NEGATIVE   POC PH, Urine  No Reference Range Established PH 7.0 6.0 6.5   POC Protein, Urine  NEGATIVE, 30 (1+) mg/dl NEGATIVE NEGATIVE NEGATIVE   POC Urobilinogen, Urine  0.2, 1.0 EU/DL 0.2 0.2 0.2   Poc Nitrite, Urine  NEGATIVE NEGATIVE NEGATIVE NEGATIVE   POC Leukocytes, Urine  NEGATIVE NEGATIVE NEGATIVE NEGATIVE               Lab Results   Component Value Date    PSA 14.84 (H) 11/18/2024    PSA 15.41 (H) 05/23/2024    PSA 16.78 (H) 04/02/2024      ASSESSMENT&PLAN:      IMPRESSIONS:          05/30/2024  Voiding well, no weight loss no bone pain     Patient has no nausea, no vomiting, no fever.     RAQUEL: Deferred     PSA elevated 15.41 but stable     We discussed elevated PSA negative prostate biopsy numerous time  We discussed 6-month monitoring PSA  We discussed benign prostate hypertrophy with mild voiding symptom  All the questions were answered, the patient expressed  understanding and agreed to the plan.     Impression  Elevated PSA, negative biopsy x 4  Benign prostate hypertrophy     Plan  Reassurance given  Repeat PSA in 6 months, 1 year  Appointment in 1 year          Chief Complaint   Patient presents with    Elevated PSA       Patient here for 3 month PSA check. He is voiding well.          Physical Exam      TODAYS LAB RESULTS:     PSA 05/23/2024  15.41  PSA 04/02/2024  16.78     POC Glucose, Urine  NEGATIVE mg/dl NEGATIVE   POC Bilirubin, Urine  NEGATIVE NEGATIVE   POC Ketones, Urine  NEGATIVE mg/dl NEGATIVE   POC Specific Gravity, Urine  1.005 - 1.035 1.010   POC Blood, Urine  NEGATIVE NEGATIVE   POC PH, Urine  No Reference Range Established PH 6.0   POC Protein, Urine  NEGATIVE, 30 (1+) mg/dl NEGATIVE   POC Urobilinogen, Urine  0.2, 1.0 EU/DL 0.2   Poc Nitrite, Urine  NEGATIVE NEGATIVE   POC Leukocytes, Urine  NEGATIVE NEGATIVE      ASSESSMENT&PLAN:        IMPRESSIONS:          02/29/2024  Patient has a long history of elevated PSA, negative prostate biopsy x 4.     Voiding well     RAQUEL: Deferred     PSA 17     We had a long discussed about elevated PSA.  Negative prostate biopsy x 4,  We discussed repeat prostate biopsy versus continued monitoring PSA  All the questions were answered, the patient expressed understanding and agreed to the plan.     Impression  Elevated PSA  Benign prostate hypertrophy     Plan  Repeat PSA in 3 months  Appointment in 3 months  Possible fifth prostate biopsy if PSA more than 20 (patient is very anxious)  Reassurance                Chief Complaint   Patient presents with    Elevated PSA       Patient here today for follow up due to elevated psa, previous patient of Dr. Guallpa.   Patient voiding well, nocturia x 2     PSA 1/15/24 17.00  PSA 2/22/24 17.22     Last Visit Plan:  Repeat PSA 4-6 weeks  For persistent elevation patient would want repeat prostate MR. Reasonable given good functional status, ECOG 0-1             Physical Exam       TODAYS LAB RESULTS:     No urine sample given today.     ASSESSMENT&PLAN:        IMPRESSIONS:     1/24/23-Dr. Guallpa  Established  1. Elevated PSA  TRUS bx 1/6/23 13/13 cores negative for malignancy   MRI 12/8/22 PI-RADS 3 lesion transition zone 1.6 cm; 62 g; PSAD 0.16  PSA 9.96 10/2022  PSA 8.36 3/2022  PSA 9.34 10/2021  Confirm MDX 2018 negative   Negative TRUS 2018  Father with prostate cancer history           03/17/2022  Voiding well on Flomax 0.4 mg daily nocturia 1-2     Patient has no nausea, no vomiting, no fever.     Patient here today for annual follow up BPH and elevated PSA.     Denies urinary frequency, urgency and nocturia. Slow stream with first void of the morning.     Taking Flomax 0.4 QHS and will need refill for 90 day supply.     RAQUEL: 2+, no nodule     PSA 3/9/22 was 8.36. Stable      IO Glucose - Urine Negative REQUIRED    IO Bilirubin Negative REQUIRED    IO Ketones Negative REQUIRED    IO Specific Gravity 1.020 REQUIRED    IO Blood Negative REQUIRED    IO pH 7.0 REQUIRED    IO Protein, Urine Negative REQUIRED    IO Urobilinogen Normal (0.2-1.0 mg/dl) REQUIRED    IO Nitrite, Urine Negative REQUIRED    IO Leukocytes Negative REQUIRED      We discussed elevated PSA, stable, negative prostate biopsy x3  We discussed benign prostate hypertrophy with mild to moderate voiding symptoms on Flomax 0.4 mg daily  All the questions were answered, the patient expressed understanding and agreed to the plan.     Impression  Elevated PSA, negative prostate biopsy x3  BPH  Dysuria  Nocturia     Plan  Continue Flomax 0.4 mg daily  Monitor PSA  Yearly RAQUEL and PSA        3/11/2021 HBM  76-year-old male who is here today for follow-up of elevated PSA. Patient is addition has bladder outlet obstruction is been taking tamsulosin states that he is voiding without difficulty. He is had 3 negative transrectal ultrasound and biopsies of the prostate in the past his most recent PSA was 10.75 in February 2021 his prior  PSA 6 months ago was greater than 11. He denies any blood or burning on urination. His urinalysis today was completely within normal limits.        PSA   11/18/2024 14.84  05/23/2024  15.41  04/02/2024  16.78  2/22/24 17.22  1/15/2024 17.00  5/15/2023 10.64  10/10/2022 9.96  9/7/22 12.49  3/9/22 8.36.  3/2021 10.75      Surgery  1/6/23 prostate biopsy negative  Prostate biopsy (-)3 times before

## 2024-12-10 NOTE — PROGRESS NOTES
[facility] Heart and Vascular Cardiology    Patient Name: Jose Antonio Rubio  Patient : 1945      Scribe Attestation  By signing my name below, IVicky Scribe   attest that this documentation has been prepared under the direction and in the presence of Franco Luis DO.      Reason for visit:  This is an 79-year-old male here for follow-up regarding his history of coronary artery disease status post PCI of his OM done in 2023, hypertension, dyslipidemia/statin intolerance.     HPI:  This is an 79-year-old male here for follow-up regarding his history of coronary artery disease status post PCI of his OM done in 2023, hypertension, dyslipidemia/statin intolerance.  The patient was last evaluated by me in 2024.  At that visit I discussed his preoperative risk prior to a dental surgery/implant, ordered blood work including BMP/CBC/magnesium be drawn in 6 months, and asked the patient to follow-up in 6 months and sooner if necessary.  BMP done 2024 showed normal serum sodium and potassium with a serum creatinine of 0.93, serum magnesium was 2.33, CBC showed hemoglobin of 16.2. ECG done today showed sinus rhythm with a heart rate of 70 bpm.  The patient reports that he has been feeling generally well from the cardiac standpoint. He denies any new chest pain, shortness of breath, palpitations and lightheadedness. He states that he takes all of his medications as prescribed. During my exam, he was resting comfortably on the exam table.          Assessment/Plan:   1. Coronary artery disease  The patient has a history of coronary artery disease status post PCI of his OM done in 2023.  ECG done today showed sinus rhythm with a heart rate of 70 bpm.    He denies anginal chest discomfort.  Blood pressure appears controlled on exam today.  He should continue his current antihypertensive medications and antiplatelet therapy.  Echocardiogram done 2023 showed normal left  ventricular systolic function with an ejection fraction of 55 to 60%, normal right ventricular systolic function, no significant valve abnormalities.  Recent lab works as noted in the HPI.  Lipid panel done 4/2/2024 showed an LDL cholesterol of 47 and triglycerides of 104 while on rosuvastatin 20 mg daily now reduced to 10 mg daily.  Lab works as noted below will be done in 6 months prior to his next visit.   Please see lifestyle recommendations below.  Follow up in 6 months and sooner if necessary.      2. Hypertension  The patient has a history of hypertension which appears controlled on exam today.  He should continue his current antihypertensive medications and monitor his blood pressure at home.      3. Dyslipidemia/statin intolerance  Lipid panel done 4/2/2024 showed an LDL cholesterol of 47 and triglycerides of 104 while on rosuvastatin 20 mg daily now reduced to 10 mg daily due to myalgia.   Lipid panel will be updated in 6 months.  Please see lifestyle recommendations below.        Orders:   CMP/lipid/magnesium/CBC in 6 months,   Follow-up in 6 months.      Lifestyle Recommendations  I recommend a whole-food plant-based diet, an eating pattern that encourages the consumption of unrefined plant foods (such as fruits, vegetables, tubers, whole grains, legumes, nuts and seeds) and discourages meats, dairy products, eggs and processed foods.     The AHA/ACC recommends that the patient consume a dietary pattern that emphasizes intake of vegetables, fruits, and whole grains; includes low-fat dairy products, poultry, fish, legumes, non-tropical vegetable oils, and nuts; and limits intake of sodium, sweets, sugar-sweetened beverages, and red meats.  Adapt this dietary pattern to appropriate calorie requirements (a 500-750 kcal/day deficit to loose weight), personal and cultural food preferences, and nutrition therapy for other medical conditions (including diabetes).  Achieve this pattern by following plans such as  the [place] Mediterranean, DASH dietary pattern, or AHA diet.     Engage in 2 hours and 30 minutes per week of moderate-intensity physical activity, or 1 hour and 15 minutes (75 minutes) per week of vigorous-intensity aerobic physical activity, or an equivalent combination of moderate and vigorous-intensity aerobic physical activity. Aerobic activity should be performed in episodes of at least 10 minutes preferably spread throughout the week.     Adhering to a heart healthy diet, regular exercise habits, avoidance of tobacco products, and maintenance of a healthy weight are crucial components of their heart disease risk reduction.     Any positive review of systems not specifically addressed in the office visit today should be evaluated and treated by the patients primary care physician or in an emergency department if necessary     Patient was notified that results from ordered tests will be called to the patient if it changes current management; it will otherwise be discussed at a future appointment and available on  Rain.     Thank you for allowing me to participate in the care of this patient.        This document was generated using the assistance of voice recognition software. If there are any errors of spelling, grammar, syntax, or meaning; please feel free to contact me directly for clarification.    Past Medical History:  He has a past medical history of Arthropathy, unspecified, Hematospermia, Personal history of other diseases of male genital organs, Personal history of other specified conditions, Sinus congestion (01/31/2023), and Subjective fever (01/31/2023).    Past Surgical History:  He has a past surgical history that includes Hernia repair; Joint replacement; Hernia repair (05/18/2017); and Hip surgery (05/18/2017).      Social History:  He reports that he has never smoked. He has never used smokeless tobacco. He reports that he does not currently use alcohol. He reports that he does not use  "drugs.    Family History:  No family history on file.     Allergies:  Patient has no known allergies.    Outpatient Medications:  Current Outpatient Medications   Medication Instructions    acetaminophen (TYLENOL) 1,000 mg, oral, Daily RT    allopurinol (Zyloprim) 300 mg tablet TAKE 1 TABLET BY MOUTH EVERY  OTHER DAY    aspirin 81 mg, oral, Daily    cholecalciferol (Vitamin D-3) 5,000 Units tablet 2 tablets, oral, Daily    clopidogrel (PLAVIX) 75 mg, oral, Daily    fish oil concentrate (Omega-3) 120-180 mg capsule 1 g, oral, 2 times daily    Lactobacillus acidoph-L.bulgar 1 million cell tablet tablet 1 tablet, oral, Daily    magnesium oxide (Mag-Ox) 200 mg split tablet oral    metoprolol succinate XL (TOPROL-XL) 25 mg, oral, Daily, DO NOT CRUSH OR CHEW    multivitamin tablet 1 tablet, oral, Daily    plant stanol cindy 450 mg tablet oral    rosuvastatin (CRESTOR) 10 mg, oral, Daily    tamsulosin (FLOMAX) 0.4 mg, oral, Daily    vitamin K2, MK-4, 100 mcg tablet 1 tablet, oral, Daily    zinc sulfate 66 mg tablet oral        ROS:  A 14 point review of systems was done and is negative other than as stated in HPI    Vitals:      12/29/2023     3:27 PM 2/12/2024    11:06 AM 4/5/2024     1:49 PM 4/7/2024    12:33 PM 6/11/2024     1:18 PM 10/10/2024     1:48 PM 11/21/2024     1:28 PM   Vitals   Systolic 126  118 161 120 144 183   Diastolic 94  78 70 82 76 74   BP Location     Left arm Left arm    Heart Rate 65  65 70 65 58 72   Temp    37.2 °C (99 °F)      Resp   16 16      Height 1.702 m (5' 7\") 1.702 m (5' 7\") 1.702 m (5' 7\")  1.727 m (5' 8\")  1.727 m (5' 8\")   Weight (lb) 166.4 166 165.8  165  165   BMI 26.06 kg/m2 26 kg/m2 25.97 kg/m2  25.09 kg/m2  25.09 kg/m2   BSA (m2) 1.89 m2 1.89 m2 1.89 m2  1.89 m2  1.89 m2        Physical Exam:     Constitutional: Cooperative, in no acute distress, alert, appears stated age.   Skin: Skin color, texture, turgor normal. No rashes or lesions.   Head: Normocephalic. No masses, lesions, " tenderness or abnormalities   Eyes: Extraocular movements are grossly intact.   Mouth and throat: Mucous membranes moist   Neck: Neck supple, no carotid bruits, no JVD   Respiratory: Lungs clear to auscultation, no wheezing or rhonchi, no use of accessory muscles   Chest wall: No scars, normal excursion with respiration   Cardiovascular: Regular rhythm without murmur, gallop, or rubs   Gastrointestinal: Abdomen soft, nontender. Bowel sounds normal.   Musculoskeletal: Strength equal in upper extremities   Extremities: Trivial pitting edema   Neurologic: Sensation grossly intact, alert and oriented x3      Intake/Output:   No intake/output data recorded.    Outpatient Medications  Current Outpatient Medications on File Prior to Visit   Medication Sig Dispense Refill    acetaminophen (Tylenol) 500 mg tablet Take 2 tablets (1,000 mg) by mouth once daily.      allopurinol (Zyloprim) 300 mg tablet TAKE 1 TABLET BY MOUTH EVERY  OTHER DAY 90 tablet 3    aspirin 81 mg EC tablet Take 1 tablet (81 mg) by mouth once daily.      cholecalciferol (Vitamin D-3) 5,000 Units tablet Take 2 tablets (10,000 Units) by mouth once daily.      clopidogrel (Plavix) 75 mg tablet TAKE 1 TABLET BY MOUTH ONCE  DAILY 90 tablet 3    fish oil concentrate (Omega-3) 120-180 mg capsule Take 1 capsule (1 g) by mouth twice a day.      Lactobacillus acidoph-L.bulgar 1 million cell tablet tablet Take 1 tablet by mouth once daily.      magnesium oxide (Mag-Ox) 200 mg split tablet Take by mouth.      metoprolol succinate XL (Toprol-XL) 25 mg 24 hr tablet TAKE 1 TABLET BY MOUTH ONCE  DAILY DO NOT CRUSH OR CHEW 90 tablet 2    multivitamin tablet Take 1 tablet by mouth once daily.      plant stanol cindy 450 mg tablet Take by mouth.      rosuvastatin (Crestor) 10 mg tablet Take 1 tablet (10 mg) by mouth once daily. 90 tablet 1    tamsulosin (Flomax) 0.4 mg 24 hr capsule Take 1 capsule (0.4 mg) by mouth once daily. 90 capsule 3    vitamin K2, MK-4, 100 mcg  tablet Take 1 tablet by mouth in the morning.      zinc sulfate 66 mg tablet Take by mouth.       No current facility-administered medications on file prior to visit.       Labs: (past 26 weeks)  Recent Results (from the past 26 weeks)   Hemoglobin A1C    Collection Time: 10/07/24  9:55 AM   Result Value Ref Range    Hemoglobin A1C 5.3 See comment %    Estimated Average Glucose 105 Not Established mg/dL   Basic metabolic panel    Collection Time: 10/07/24  9:55 AM   Result Value Ref Range    Glucose 98 74 - 99 mg/dL    Sodium 141 136 - 145 mmol/L    Potassium 4.3 3.5 - 5.3 mmol/L    Chloride 108 (H) 98 - 107 mmol/L    Bicarbonate 28 21 - 32 mmol/L    Anion Gap 9 (L) 10 - 20 mmol/L    Urea Nitrogen 21 6 - 23 mg/dL    Creatinine 0.88 0.50 - 1.30 mg/dL    eGFR 86 >60 mL/min/1.73m*2    Calcium 9.4 8.6 - 10.3 mg/dL   Vitamin B12    Collection Time: 10/07/24  9:55 AM   Result Value Ref Range    Vitamin B12 1,000 (H) 211 - 911 pg/mL   PSA    Collection Time: 11/18/24 11:18 AM   Result Value Ref Range    Prostate Specific AG 14.84 (H) <=4.00 ng/mL   Basic Metabolic Panel    Collection Time: 11/18/24 11:18 AM   Result Value Ref Range    Glucose 86 74 - 99 mg/dL    Sodium 140 136 - 145 mmol/L    Potassium 4.6 3.5 - 5.3 mmol/L    Chloride 104 98 - 107 mmol/L    Bicarbonate 29 21 - 32 mmol/L    Anion Gap 12 10 - 20 mmol/L    Urea Nitrogen 22 6 - 23 mg/dL    Creatinine 0.93 0.50 - 1.30 mg/dL    eGFR 82 >60 mL/min/1.73m*2    Calcium 9.7 8.6 - 10.3 mg/dL   CBC    Collection Time: 11/18/24 11:18 AM   Result Value Ref Range    WBC 7.0 4.4 - 11.3 x10*3/uL    nRBC 0.0 0.0 - 0.0 /100 WBCs    RBC 4.93 4.50 - 5.90 x10*6/uL    Hemoglobin 16.2 13.5 - 17.5 g/dL    Hematocrit 48.2 41.0 - 52.0 %    MCV 98 80 - 100 fL    MCH 32.9 26.0 - 34.0 pg    MCHC 33.6 32.0 - 36.0 g/dL    RDW 14.6 (H) 11.5 - 14.5 %    Platelets 181 150 - 450 x10*3/uL   Magnesium    Collection Time: 11/18/24 11:18 AM   Result Value Ref Range    Magnesium 2.33 1.60 - 2.40  mg/dL   POCT UA Automated manually resulted    Collection Time: 11/21/24  1:29 PM   Result Value Ref Range    POC Glucose, Urine NEGATIVE NEGATIVE mg/dl    POC Bilirubin, Urine NEGATIVE NEGATIVE    POC Ketones, Urine NEGATIVE NEGATIVE mg/dl    POC Specific Gravity, Urine 1.010 1.005 - 1.035    POC Blood, Urine NEGATIVE NEGATIVE    POC PH, Urine 7.0 No Reference Range Established PH    POC Protein, Urine NEGATIVE NEGATIVE, 30 (1+) mg/dl    POC Urobilinogen, Urine 0.2 0.2, 1.0 EU/DL    Poc Nitrite, Urine NEGATIVE NEGATIVE    POC Leukocytes, Urine NEGATIVE NEGATIVE       ECG  Encounter Date: 06/11/24   ECG 12 Lead    Narrative    Sinus rhythm, first-degree AV block, heart rate 65 bpm.       Echocardiogram  No results found for this or any previous visit from the past 1095 days.      CV Studies:  EKG:  Encounter Date: 06/11/24   ECG 12 Lead    Narrative    Sinus rhythm, first-degree AV block, heart rate 65 bpm.     Echocardiogram:   [place] [address]  Phone [phone] Fax [phone]    TRANSTHORACIC ECHOCARDIOGRAM REPORT      Patient Name:     RITA CHINCHILLA KENTRELLMARIELA      Reading Physician:   [id number] Franco Luis DO  Study Date:       2/28/2023           Referring Physician: REGINALDO PULIDO  MRN/PID:          [id number]            PCP:  Accession/Order#: [id number]           Department Location: [address]  YOB: 1945           Fellow:  Gender:           M                   Nurse:  Admit Date:       2/27/2023           Sonographer:         Jill Pulido [facility]  Admission Status: Inpatient - Routine Additional Staff:  Height:           172.72 cm           CC Report to:  Weight:           79.83 kg            Study Type:          Echocardiogram  BSA:              1.94 m2  Blood Pressure: 157 /65 mmHg    Diagnosis/ICD: R07.89-Other chest pain  Indication:    Chest Pain  Procedure/CPT: Echo Complete w Full [id number]  Study Detail: The  following Echo studies were performed: 2D, M-Mode, Doppler and  color flow.      PHYSICIAN INTERPRETATION:  Left Ventricle: Left ventricular systolic function is normal, with an estimated ejection fraction of 55-60%. There are no regional wall motion abnormalities. The left ventricular cavity size is normal. Spectral Doppler shows a normal pattern of left ventricular diastolic filling.  Left Atrium: The left atrium is normal in size.  Right Ventricle: The right ventricle is normal in size. There is normal right ventricular global systolic function.  Right Atrium: The right atrium is normal in size.  Aortic Valve: The aortic valve is trileaflet. There is trivial aortic valve regurgitation. The peak instantaneous gradient of the aortic valve is 10.9 mmHg. The mean gradient of the aortic valve is 5.0 mmHg.  Mitral Valve: The mitral valve is normal in structure. There is trace mitral valve regurgitation.  Tricuspid Valve: The tricuspid valve is structurally normal. There is trace tricuspid regurgitation.  Pulmonic Valve: The pulmonic valve is structurally normal. There is physiologic pulmonic valve regurgitation.  Pericardium: There is no pericardial effusion noted.  Aorta: The aortic root is normal.      CONCLUSIONS:  1. Left ventricular systolic function is normal with a 55-60% estimated ejection fraction.    QUANTITATIVE DATA SUMMARY:  2D MEASUREMENTS:  Normal Ranges:  Ao Root d:     2.80 cm   (2.0-3.7cm)  LAs:           4.30 cm   (2.7-4.0cm)  IVSd:          1.15 cm   (0.6-1.1cm)  LVPWd:         1.10 cm   (0.6-1.1cm)  LVIDd:         4.47 cm   (3.9-5.9cm)  LVIDs:         2.31 cm  LV Mass Index: 92.3 g/m2  LV % FS        48.3 %    LA VOLUME:  Normal Ranges:  LA Vol A4C:        32.1 ml    (22+/-6mL/m2)  LA Vol A2C:        34.0 ml  LA Vol BP:         34.4 ml  LA Vol Index A4C:  16.6ml/m2  LA Vol Index A2C:  17.6 ml/m2  LA Vol Index BP:   17.8 ml/m2  LA Area A4C:       13.6 cm2  LA Area A2C:       14.6 cm2  LA Major Axis  A4C: 4.9 cm  LA Major Axis A2C: 5.3 cm  LA Volume Index:   16.6 ml/m2    RA VOLUME BY A/L METHOD:  Normal Ranges:  RA Area A4C: 7.9 cm2    M-MODE MEASUREMENTS:  Normal Ranges:  Ao Root: 2.80 cm (2.0-3.7cm)    AORTA MEASUREMENTS:  Normal Ranges:  Asc Ao, d: 3.00 cm (2.1-3.4cm)    LV SYSTOLIC FUNCTION BY 2D PLANIMETRY (MOD):  Normal Ranges:  EF-A4C View: 58.7 % (>=55%)  EF-A2C View: 55.5 %  EF-Biplane:  55.7 %    LV DIASTOLIC FUNCTION:  Normal Ranges:  MV Peak E:    0.53 m/s (0.7-1.2 m/s)  MV Peak A:    1.05 m/s (0.42-0.7 m/s)  E/A Ratio:    0.50     (1.0-2.2)  MV e'         0.10 m/s (>8.0)  MV lateral e' 0.12 m/s  MV medial e'  0.07 m/s  E/e' Ratio:   5.55     (<8.0)    MITRAL VALVE:  Normal Ranges:  MV DT: 411 msec (150-240msec)    AORTIC VALVE:  Normal Ranges:  AoV Vmax:                1.65 m/s  (<=1.7m/s)  AoV Peak PG:             10.9 mmHg (<20mmHg)  AoV Mean P.0 mmHg  (1.7-11.5mmHg)  LVOT Max Kyler:            0.95 m/s  (<=1.1m/s)  AoV VTI:                 31.30 cm  (18-25cm)  LVOT VTI:                19.10 cm  LVOT Diameter:           2.00 cm   (1.8-2.4cm)  AoV Area, VTI:           1.92 cm2  (2.5-5.5cm2)  AoV Area,Vmax:           1.81 cm2  (2.5-4.5cm2)  AoV Dimensionless Index: 0.61    RIGHT VENTRICLE:  RV 1   2.83 cm  RV 2   2.38 cm  RV 3   5.97 cm  TAPSE: 21.9 mm  RV s'  0.16 m/s    TRICUSPID VALVE/RVSP:  Normal Ranges:  TV E Vmax: 0.32 m/s (0.3-0.7m/s)  TV A Vmax: 0.33 m/s  IVC Diam:  1.87 cm      [id number] Franco Luis DO  Electronically signed on 2023 at 12:11:47 PM         Final     Stress Testing IMESULT([id number]): No results found for this or any previous visit from the past 5 days.    Cardiac Catheterization:   Adult Cath     [facility], Cath Lab  [address] [place], [place] [id number]  Phone [phone] Fax [phone]    Cardiovascular Catheterization Report    Patient Name:     RITA HUBER Performing Physician: [id number] Aric Maldonado MD  Study  Date:       3/1/2023       Verifying Physician:  [id number] Aric Maldonado MD  MRN/PID:          [id number]       Cardiologist:  Accession/Order#: [id number]      Referring Physician:  MARY PURCELL  YOB: 1945      Referring Physician:  Gender:           M              Referring Physician:      Study: Left Heart Catheterization      Indications:  RITA HUBER is a 78 year old male who presents with hypertension and dyslipidemia. New onset angina <=2 months, new onset angina <= 2 Months and other PCI indication - unstable angina , with a chest pain assessment of typical angina. Study performed as an urgent cath procedure.    Medical History:  Stress test performed: No. CTA performed: No. Agatston accessed: No. LVEF Assessed: Yes.    Procedure Description:  After infiltration with 2% Lidocaine, the right radial artery was cannulated with a modified Seldinger technique. Subsequently a 6 Kitt[place] sheath was placed in the right radial artery. After infiltration with 2% Lidocaine, a second arterial access was obtained via the right femoral artery with a modified Seldinger technique and a 6 [place] sheath was placed. This second arterial access was obtained to allow for subclavian artery tortuosity. Selective coronary catheterization was performed using a 6 Fr catheter(s) exchanged over a guide wire to cannulate the coronary arteries. A JL 4 tip catheter was used for left coronary injections. A JR 4 tip catheter was used for right coronary injections.  Multiple injections of contrast were made into the left and right coronary arteries with angiograms recorded in multiple projections. After completion of the procedure, the arterial sheath was pulled and a TR Band Radial Compression Device was utilized to obtain patent hemostasis. Femoral artery angiography was performed at the additional arterial access site. This demonstrated a common femoral artery puncture appropriate for closure. An Angio-Seal Evolution 6F ([employer]) vascular closure device was placed per protocol.    Coronary Angiography:  The coronary circulation is co-dominant.    Left Main Coronary Artery:  The left main coronary artery is a normal caliber vessel. The left main arises normally from the left coronary sinus of Valsalva and bifurcates into the LAD and circumflex coronary arteries. The left main coronary artery showed no significant disease or stenosis greater than 30%.    Left Anterior Descending Coronary Artery Distribution:  The left anterior descending coronary artery is a normal caliber vessel. The LAD arises normally from the left main coronary artery and wraps around the apex of the LV. The LAD demonstrated diffuse mild atherosclerotic disease. The mid left anterior descending coronary artery showed ~ 30-40%. This lesion was mildly calcified. An additional lesion, located at the mid left anterior descending coronary artery, revealed 30% stenosis. This second lesion was calcified. The 1st diagonal branch is a normal caliber vessel. The 1st diagonal branch showed mild atherosclerotic disease.    Circumflex Coronary Artery Distribution:  The circumflex coronary artery is a large caliber vessel. The circumflex arises normally from the left main coronary artery, giving rise to the first obtuse marginal and supplies the left posterolateral descending artery segment. The circumflex revealed mild atherosclerotic disease. The 1st obtuse marginal branch is a large and branching vessel caliber vessel. The mid 1st obtuse marginal branch showed 95-99%. The left posterior descending artery is a normal caliber vessel. The left posterior descending artery showed mild atherosclerotic disease.    Right Coronary Artery Distribution:    The right coronary artery is a medium-sized caliber vessel. The RCA arises normally from the right sinus of Valsalva and supplies the right posterolateral descending artery. The RCA showed diffuse mild atherosclerotic disease.  The right posterior descending artery is a medium-sized caliber vessel. The right posterior descending artery showed mild atherosclerotic disease.    Coronary Interventions:  Angiography reveals a 99% stenosis of the mid 1st obtuse marginal and lower branch of OM1. Pre-intervention MASHA flow was 3. Percutaneous coronary intervention was performed within the mid 1st obtuse marginal and lower branch of OM1. The vessel was pre-dilated using a compliant balloon 2.0 mm x 15 mm at 12 STEPHAN. SYNERGY XD Everolimus drug-eluting stent 2.25 mm x 28 mm was advanced to the lesion and implanted at 12 STEPHAN. The stent was post dilated using a non-compliant balloon 2.5 mm x 15 mm at 16-20 STEPHAN. The stenosis was successfully reduced from 99% to 0%. Post-intervention MASHA flow was 3. Upfront antiplatelets were ASA and clopidogrel. Heparin was given IV to achieve an ACT > 300. 6Fr EBU 3.5 guide catheter was used to engage the LMCA. .014 BMW guidewire was placed into the LCx with distal tip in the upper branch of OM1. A .014 Runthrough wire was used to cross lesion with tip in the distal lower branch of OM1. The lesion was dilated with SC 2.0 x 15mm balloon at 12 stephan. A Synergy XD  2.25 x 28mm LU was deployed in the lower branch of OM1 at 12 stephan. The stent was postdilated with an NC 2.5mm balloon at 16-18 in the mid-distal portion, and at 20 stephan in the proximal portion. Good angioraphic result with MASHA flow in the vesse, no residual dissection or evidence of distal embolization. Guidwires and guide were withdrawn, and the patient was chest pain free upon transfer to the ICU.        Complications:  No in-lab complications observed.    Cardiac Cath Transition of Care Summary:  Post Procedure           LU of Circumflex.  Diagnosis:  Blood Loss:              Estimated blood loss during the procedure was minimal  mls.  Specimens Removed:       Number of specimen(s) removed: for ACT's.      Recommendations:  Maximize medical  therapy.  Agressive risk factor modification efforts.  Telemetry monitoring.  Follow-up with cardiology clinic.  Monitor vitals and arterial access site/pulses.  Anti-platelet therapy with Aspirin and Ticagrelor 90mgs BID.  Lipid lowering agent or Statin therapy.  Consider referral to cardiac rehabilitation.  Beta blocker therapy.  Angiotensin-converting enzyme (ACE) inhibitor for LV systolic dysfunction.    ____________________________________________________________________________________  CONCLUSIONS:  1. Unstable angina s/p successful PCI of lower branch of OM1 culprit lesion.  2. Mild-moderate LAD disease.  3. Mild diffuse RCA disease. Codominant system.    ____________________________________________________________________________________  CPT Codes:  Coronary Angiography S&I only ([facility])([facility])-11108; Revasc during AMI stent/angio/atherc,ins asp Thrombectomy, Left Circumflex single Vessel (PCI)-12555.LC; Moderate Sedation Services initial 15 minutes patient >5 years-03309; Moderate Sedation Services 1st additional 15 minutes patient >5 years-79581; Moderate Sedation Services 2nd additional 15 minutes patient >5 years-24870; Moderate Sedation Services 3rd additional 15 minutes patient >5 years-32453; Moderate Sedation Services 4th additional 15 minutes patient >5 years-49580    ICD 10 Codes:  I20.0-Unstable angina; R94.30-Abnormal result of cardiovascular function study, unspecified    [id number] Aric Maldonado MD  Performing Physician  Electronically signed by [id number] Aric Maldonado MD on 3/2/2023 at 8:56:00 AM      cc Report to: MARY PURCELL           Final   No results found for this or any previous visit from the past 3650 days.     Cardiac Scoring: No results found for this or any previous visit from the past 1825 days.    AAA : No results found for this or any previous visit from the past 1825 days.    OTHER: No results found for this or any previous visit from the past 1825 days.    LAST IMAGING RESULTS  ECG 12  Lead  Sinus rhythm, first-degree AV block, heart rate 65 bpm.    Problem List Items Addressed This Visit       Dyslipidemia    Hypertension    CAD (coronary artery disease) - Primary    Statin intolerance          Franco Luis DO, FACC, FACOI

## 2024-12-16 ENCOUNTER — APPOINTMENT (OUTPATIENT)
Dept: CARDIOLOGY | Facility: CLINIC | Age: 79
End: 2024-12-16
Payer: MEDICARE

## 2024-12-16 VITALS
BODY MASS INDEX: 25.01 KG/M2 | WEIGHT: 165 LBS | SYSTOLIC BLOOD PRESSURE: 130 MMHG | HEIGHT: 68 IN | HEART RATE: 70 BPM | DIASTOLIC BLOOD PRESSURE: 72 MMHG

## 2024-12-16 DIAGNOSIS — I25.10 CORONARY ARTERY DISEASE INVOLVING NATIVE CORONARY ARTERY OF NATIVE HEART WITHOUT ANGINA PECTORIS: Primary | ICD-10-CM

## 2024-12-16 DIAGNOSIS — Z01.810 PREOPERATIVE CARDIOVASCULAR EXAMINATION: ICD-10-CM

## 2024-12-16 DIAGNOSIS — Z78.9 STATIN INTOLERANCE: ICD-10-CM

## 2024-12-16 DIAGNOSIS — I10 PRIMARY HYPERTENSION: ICD-10-CM

## 2024-12-16 DIAGNOSIS — E78.5 DYSLIPIDEMIA: ICD-10-CM

## 2024-12-16 LAB
ATRIAL RATE: 70 BPM
P AXIS: 44 DEGREES
P OFFSET: 175 MS
P ONSET: 131 MS
PR INTERVAL: 192 MS
Q ONSET: 227 MS
QRS COUNT: 11 BEATS
QRS DURATION: 90 MS
QT INTERVAL: 384 MS
QTC CALCULATION(BAZETT): 414 MS
QTC FREDERICIA: 404 MS
R AXIS: 40 DEGREES
T AXIS: -12 DEGREES
T OFFSET: 419 MS
VENTRICULAR RATE: 70 BPM

## 2024-12-16 PROCEDURE — 1036F TOBACCO NON-USER: CPT | Performed by: INTERNAL MEDICINE

## 2024-12-16 PROCEDURE — 1157F ADVNC CARE PLAN IN RCRD: CPT | Performed by: INTERNAL MEDICINE

## 2024-12-16 PROCEDURE — 99214 OFFICE O/P EST MOD 30 MIN: CPT | Performed by: INTERNAL MEDICINE

## 2024-12-16 PROCEDURE — 99214 OFFICE O/P EST MOD 30 MIN: CPT | Mod: 25 | Performed by: INTERNAL MEDICINE

## 2024-12-16 PROCEDURE — 3075F SYST BP GE 130 - 139MM HG: CPT | Performed by: INTERNAL MEDICINE

## 2024-12-16 PROCEDURE — 3078F DIAST BP <80 MM HG: CPT | Performed by: INTERNAL MEDICINE

## 2024-12-16 PROCEDURE — 93010 ELECTROCARDIOGRAM REPORT: CPT | Performed by: INTERNAL MEDICINE

## 2024-12-16 PROCEDURE — 1159F MED LIST DOCD IN RCRD: CPT | Performed by: INTERNAL MEDICINE

## 2024-12-16 PROCEDURE — 93005 ELECTROCARDIOGRAM TRACING: CPT | Performed by: INTERNAL MEDICINE

## 2024-12-23 DIAGNOSIS — I10 PRIMARY HYPERTENSION: ICD-10-CM

## 2024-12-23 DIAGNOSIS — I25.10 CORONARY ARTERY DISEASE INVOLVING NATIVE CORONARY ARTERY OF NATIVE HEART WITHOUT ANGINA PECTORIS: ICD-10-CM

## 2024-12-23 DIAGNOSIS — E78.5 DYSLIPIDEMIA: ICD-10-CM

## 2024-12-23 RX ORDER — ROSUVASTATIN CALCIUM 10 MG/1
10 TABLET, COATED ORAL DAILY
Qty: 90 TABLET | Refills: 3 | OUTPATIENT
Start: 2024-12-23

## 2024-12-24 RX ORDER — METOPROLOL SUCCINATE 25 MG/1
25 TABLET, EXTENDED RELEASE ORAL DAILY
Qty: 90 TABLET | Refills: 1 | Status: SHIPPED | OUTPATIENT
Start: 2024-12-24

## 2024-12-24 RX ORDER — ROSUVASTATIN CALCIUM 10 MG/1
10 TABLET, COATED ORAL DAILY
Qty: 90 TABLET | Refills: 1 | Status: SHIPPED | OUTPATIENT
Start: 2024-12-24

## 2024-12-24 RX ORDER — CLOPIDOGREL BISULFATE 75 MG/1
75 TABLET ORAL DAILY
Qty: 90 TABLET | Refills: 1 | Status: SHIPPED | OUTPATIENT
Start: 2024-12-24

## 2024-12-31 DIAGNOSIS — E78.5 DYSLIPIDEMIA: ICD-10-CM

## 2024-12-31 NOTE — TELEPHONE ENCOUNTER
----- Message from Nurse Geni PENNINGTON sent at 12/30/2024  4:46 PM EST -----  Regarding: Refill  Patient is requesting a refill of rosuvastatin 10 mg to be sent to [facility]

## 2025-01-14 RX ORDER — ROSUVASTATIN CALCIUM 10 MG/1
10 TABLET, COATED ORAL DAILY
Qty: 90 TABLET | Refills: 1 | Status: SHIPPED | OUTPATIENT
Start: 2025-01-14

## 2025-04-08 LAB
25(OH)D3+25(OH)D2 SERPL-MCNC: 101 NG/ML (ref 30–100)
ALBUMIN SERPL-MCNC: 4.7 G/DL (ref 3.6–5.1)
ALP SERPL-CCNC: 49 U/L (ref 35–144)
ALT SERPL-CCNC: 17 U/L (ref 9–46)
ANION GAP SERPL CALCULATED.4IONS-SCNC: 9 MMOL/L (CALC) (ref 7–17)
AST SERPL-CCNC: 15 U/L (ref 10–35)
BILIRUB SERPL-MCNC: 0.7 MG/DL (ref 0.2–1.2)
BUN SERPL-MCNC: 22 MG/DL (ref 7–25)
CALCIUM SERPL-MCNC: 9.7 MG/DL (ref 8.6–10.3)
CHLORIDE SERPL-SCNC: 105 MMOL/L (ref 98–110)
CHOLEST SERPL-MCNC: 125 MG/DL
CHOLEST/HDLC SERPL: 2.7 (CALC)
CO2 SERPL-SCNC: 27 MMOL/L (ref 20–32)
CREAT SERPL-MCNC: 0.9 MG/DL (ref 0.7–1.22)
EGFRCR SERPLBLD CKD-EPI 2021: 85 ML/MIN/1.73M2
ERYTHROCYTE [DISTWIDTH] IN BLOOD BY AUTOMATED COUNT: 12.5 % (ref 11–15)
EST. AVERAGE GLUCOSE BLD GHB EST-MCNC: 108 MG/DL
EST. AVERAGE GLUCOSE BLD GHB EST-SCNC: 6 MMOL/L
GLUCOSE SERPL-MCNC: 91 MG/DL (ref 65–99)
HBA1C MFR BLD: 5.4 % OF TOTAL HGB
HCT VFR BLD AUTO: 47.3 % (ref 38.5–50)
HDLC SERPL-MCNC: 47 MG/DL
HGB BLD-MCNC: 16 G/DL (ref 13.2–17.1)
LDLC SERPL CALC-MCNC: 58 MG/DL (CALC)
MCH RBC QN AUTO: 32.1 PG (ref 27–33)
MCHC RBC AUTO-ENTMCNC: 33.8 G/DL (ref 32–36)
MCV RBC AUTO: 95 FL (ref 80–100)
NONHDLC SERPL-MCNC: 78 MG/DL (CALC)
PLATELET # BLD AUTO: 252 THOUSAND/UL (ref 140–400)
PMV BLD REES-ECKER: 10.6 FL (ref 7.5–12.5)
POTASSIUM SERPL-SCNC: 4.2 MMOL/L (ref 3.5–5.3)
PROT SERPL-MCNC: 7.2 G/DL (ref 6.1–8.1)
RBC # BLD AUTO: 4.98 MILLION/UL (ref 4.2–5.8)
SODIUM SERPL-SCNC: 141 MMOL/L (ref 135–146)
TRIGL SERPL-MCNC: 114 MG/DL
TSH SERPL-ACNC: 2.23 MIU/L (ref 0.4–4.5)
VIT B12 SERPL-MCNC: 1256 PG/ML (ref 200–1100)
WBC # BLD AUTO: 6.4 THOUSAND/UL (ref 3.8–10.8)

## 2025-04-09 ENCOUNTER — APPOINTMENT (OUTPATIENT)
Dept: PRIMARY CARE | Facility: CLINIC | Age: 80
End: 2025-04-09
Payer: MEDICARE

## 2025-04-09 VITALS
OXYGEN SATURATION: 96 % | WEIGHT: 172.4 LBS | DIASTOLIC BLOOD PRESSURE: 80 MMHG | SYSTOLIC BLOOD PRESSURE: 138 MMHG | HEART RATE: 60 BPM | BODY MASS INDEX: 26.13 KG/M2 | HEIGHT: 68 IN

## 2025-04-09 DIAGNOSIS — N13.8 BENIGN LOCALIZED HYPERPLASIA OF PROSTATE WITH URINARY OBSTRUCTION: ICD-10-CM

## 2025-04-09 DIAGNOSIS — I25.10 ARTERIOSCLEROSIS OF CORONARY ARTERY: ICD-10-CM

## 2025-04-09 DIAGNOSIS — Z00.00 MEDICARE ANNUAL WELLNESS VISIT, SUBSEQUENT: Primary | ICD-10-CM

## 2025-04-09 DIAGNOSIS — Z00.00 ROUTINE GENERAL MEDICAL EXAMINATION AT HEALTH CARE FACILITY: ICD-10-CM

## 2025-04-09 DIAGNOSIS — E78.5 DYSLIPIDEMIA: ICD-10-CM

## 2025-04-09 DIAGNOSIS — E55.9 VITAMIN D DEFICIENCY: ICD-10-CM

## 2025-04-09 DIAGNOSIS — R79.89 ELEVATED VITAMIN B12 LEVEL: ICD-10-CM

## 2025-04-09 DIAGNOSIS — N40.1 BENIGN LOCALIZED HYPERPLASIA OF PROSTATE WITH URINARY OBSTRUCTION: ICD-10-CM

## 2025-04-09 DIAGNOSIS — Z87.898 HISTORY OF PREDIABETES: ICD-10-CM

## 2025-04-09 DIAGNOSIS — Z78.9 ADVANCE DIRECTIVE DECLINED BY PATIENT: ICD-10-CM

## 2025-04-09 DIAGNOSIS — I10 PRIMARY HYPERTENSION: ICD-10-CM

## 2025-04-09 DIAGNOSIS — Z87.39 HISTORY OF GOUT: ICD-10-CM

## 2025-04-09 PROBLEM — R52 BODY ACHES: Status: RESOLVED | Noted: 2024-04-05 | Resolved: 2025-04-09

## 2025-04-09 PROBLEM — M10.9 GOUT: Status: RESOLVED | Noted: 2023-01-31 | Resolved: 2025-04-09

## 2025-04-09 PROCEDURE — 1160F RVW MEDS BY RX/DR IN RCRD: CPT | Performed by: NURSE PRACTITIONER

## 2025-04-09 PROCEDURE — 1157F ADVNC CARE PLAN IN RCRD: CPT | Performed by: NURSE PRACTITIONER

## 2025-04-09 PROCEDURE — 99214 OFFICE O/P EST MOD 30 MIN: CPT | Performed by: NURSE PRACTITIONER

## 2025-04-09 PROCEDURE — G0439 PPPS, SUBSEQ VISIT: HCPCS | Performed by: NURSE PRACTITIONER

## 2025-04-09 PROCEDURE — 3075F SYST BP GE 130 - 139MM HG: CPT | Performed by: NURSE PRACTITIONER

## 2025-04-09 PROCEDURE — 1170F FXNL STATUS ASSESSED: CPT | Performed by: NURSE PRACTITIONER

## 2025-04-09 PROCEDURE — 1036F TOBACCO NON-USER: CPT | Performed by: NURSE PRACTITIONER

## 2025-04-09 PROCEDURE — 3079F DIAST BP 80-89 MM HG: CPT | Performed by: NURSE PRACTITIONER

## 2025-04-09 PROCEDURE — 1159F MED LIST DOCD IN RCRD: CPT | Performed by: NURSE PRACTITIONER

## 2025-04-09 ASSESSMENT — ACTIVITIES OF DAILY LIVING (ADL)
DRESSING: INDEPENDENT
GROCERY_SHOPPING: INDEPENDENT
BATHING: INDEPENDENT
DOING_HOUSEWORK: INDEPENDENT
MANAGING_FINANCES: INDEPENDENT
TAKING_MEDICATION: INDEPENDENT

## 2025-04-09 ASSESSMENT — PATIENT HEALTH QUESTIONNAIRE - PHQ9
1. LITTLE INTEREST OR PLEASURE IN DOING THINGS: NOT AT ALL
SUM OF ALL RESPONSES TO PHQ9 QUESTIONS 1 AND 2: 0
2. FEELING DOWN, DEPRESSED OR HOPELESS: NOT AT ALL

## 2025-04-09 ASSESSMENT — ENCOUNTER SYMPTOMS
DEPRESSION: 0
OCCASIONAL FEELINGS OF UNSTEADINESS: 0
LOSS OF SENSATION IN FEET: 0

## 2025-04-09 NOTE — ASSESSMENT & PLAN NOTE
Orders:    Follow Up In [facility] - PCP - Established; Future    Vitamin D 25-Hydroxy,Total (for eval of Vitamin D levels); Future

## 2025-04-09 NOTE — PATIENT INSTRUCTIONS
Your lab results are unremarkable besides your serum vitamin B12 level that is slightly elevated at 1256 and serum vitamin D level is slightly elevated 101.  I recommend that you cut both your vitamin B12 vitamin D intake by 50%.  I have ordered labs for you to complete a few days prior to 6-month follow-up.

## 2025-04-09 NOTE — ASSESSMENT & PLAN NOTE
Orders:    Follow Up In [facility] - PCP - Established; Future    Vitamin B12; Future

## 2025-04-09 NOTE — PROGRESS NOTES
"Subjective   Reason for Visit: Jose Antonio Rubio is an 80 y.o. male here for a Medicare Wellness visit.     Past Medical, Surgical, and Family History reviewed and updated in chart.    Reviewed all medications by prescribing practitioner or clinical pharmacist (such as prescriptions, OTCs, herbal therapies and supplements) and documented in the medical record.    Patient is ambulatory without assistance accompanied by his wife following up for annual Medicare wellness exam, lab results review and management of history of gout, history of prediabetes, elevated vitamin B12 level and vitamin D deficiency.  He is followed by urology for management of BPH.  He is under the care of cardiology for management of dyslipidemia and coronary artery disease.  He is compliant with his prescribed medications.  Reports feeling great and denies acute medical complaint.        Patient Care Team:  EVER Sylvester-CNP, DNP as PCP - General (Family Medicine)     Review of Systems   All other systems reviewed and are negative.      Objective   Vitals:  Ht 1.727 m (5' 8\")   Wt 78.2 kg (172 lb 6.4 oz)   BMI 26.21 kg/m²       Physical Exam  Vitals reviewed.   Constitutional:       Appearance: Normal appearance.   HENT:      Head: Normocephalic and atraumatic.      Right Ear: External ear normal.      Left Ear: External ear normal.      Nose: Nose normal.      Mouth/Throat:      Mouth: Mucous membranes are moist.   Cardiovascular:      Rate and Rhythm: Normal rate and regular rhythm.      Pulses: Normal pulses.      Heart sounds: Normal heart sounds.   Pulmonary:      Effort: Pulmonary effort is normal.      Breath sounds: Normal breath sounds.   Abdominal:      General: Bowel sounds are normal.      Palpations: Abdomen is soft.   Musculoskeletal:      Cervical back: Neck supple.   Skin:     General: Skin is warm and dry.   Neurological:      General: No focal deficit present.      Mental Status: He is alert and oriented to person, " place, and time.   Psychiatric:         Mood and Affect: Mood normal.         Behavior: Behavior normal.         Thought Content: Thought content normal.         Judgment: Judgment normal.         Assessment & Plan  Medicare annual wellness visit, subsequent    Orders:    Follow Up In [facility] - PCP - Medicare Annual    Routine [facility] medical examination [facility]    Orders:    [facility] Wellness Exam; Future    Elevated vitamin B12 level    Orders:    [facility]  PCP [facility]; Future    Vitamin B12; Future    Primary hypertension    Orders:    [facility]  PCP [facility]; Future    Basic metabolic panel; Future    CBC; Future    Vitamin D deficiency    Orders:    [facility] PCP [facility]; Future    Vitamin D 25-Hydroxy,Total (for eval of Vitamin D levels); Future    History of prediabetes    Orders:    Basic metabolic panel; Future    Hemoglobin A1C; Future    Dyslipidemia         Arteriosclerosis of coronary artery         Benign localized hyperplasia of prostate with urinary obstruction         History of gout    Orders:    Uric acid; Future    Advance directive declined by patient

## 2025-04-16 ENCOUNTER — TELEPHONE (OUTPATIENT)
Dept: CARDIOLOGY | Facility: HOSPITAL | Age: 80
End: 2025-04-16
Payer: MEDICARE

## 2025-04-16 NOTE — TELEPHONE ENCOUNTER
4/16/25  1592  Patient called in to inquire of medications and getting a dental implant.    Informed patient that he would need a dental clearance for this or that the dentist could give guidance for the hold for plavix.  Since patient reported that dentist would not give that kind of recommendation, nurse suggested pushing up his June appt to May which patient was agreeable to.    Deferred to [facility] to change appt.

## 2025-04-16 NOTE — TELEPHONE ENCOUNTER
Pt called to speak to Huang TEMPLETON regarding upcoming dental procedure with Dr. Weir ([place]). Huang TEMPLETON informed me pt will need clearance appt with Dr. Luis sooner than June appt. Pt agreeable to move up June appt to 4/23 2:30pm w/ Dr. Luis at [facility]. Will send office note to Dr. Weir's office after visit (phone #  [phone]).

## 2025-04-17 PROBLEM — Z01.810 PREOPERATIVE CARDIOVASCULAR EXAMINATION: Status: ACTIVE | Noted: 2025-04-17

## 2025-04-17 NOTE — PROGRESS NOTES
[facility] Heart and Vascular Cardiology    Patient Name: Jose Antonio Rubio  Patient : 1945    Scribe Attestation  By signing my name below, Vicky GUEVARA, Scribe attest that this documentation has been prepared under the direction and in the presence of Franco Luis DO.    Physician Attestation  Franco GUEVARA DO, personally performed the services described in the documentation as scribed by Vicky Stevenson in my presence, and confirm it is both accurate and complete.    Reason for visit:  This is an 80-year-old male here for follow-up regarding coronary artery disease status post PCI of his OM done in 2023, hypertension, dyslipidemia/statin intolerance, and for preoperative cardiovascular examination prior to a dental procedure.     HPI:  This is an 80-year-old male here for follow-up regarding coronary artery disease status post PCI of his OM done in 2023, hypertension, dyslipidemia/statin intolerance, and for preoperative cardiovascular examination prior to a dental procedure.  Patient was last evaluated by me in 2024.  At that visit I had ordered blood work including CMP/lipid/magnesium/CBC to be drawn in 6 months and asked the patient to follow-up in 6 months and sooner if necessary.  CMP done on 2025 showed normal serum sodium and potassium with a serum creatinine is 0.90, normal ALT/AST, hemoglobin A1c was 5.4%, TSH was 2.23, CBC showed a hemoglobin of 16.0.  Lipid panel done 2025 showed an LDL cholesterol of 58 and triglycerides of 114 while on rosuvastatin 10 mg daily. ECG done today showed sinus rhythm with a heart rate of 62 bpm.  The patient reports that he has been feeling generally well from the cardiac standpoint. He denies any new chest pain, shortness of breath, palpitations and lightheadedness. He states that he takes all of his medications as prescribed. During my exam, he was resting comfortably on the exam table.             Assessment/Plan:   1. Coronary artery disease  The patient has a history of coronary artery disease status post PCI of his OM done in March 2023.  ECG done today showed sinus rhythm with a heart rate of 62 bpm.     He denies anginal chest discomfort.  Blood pressure appears controlled on exam today.  He should continue his current antihypertensive medications.  I will discontinue clopidogrel and patient should continue low dose aspirin.   Echocardiogram done 2/28/2023 showed normal left ventricular systolic function with an ejection fraction of 55 to 60%, normal right ventricular systolic function, no significant valve abnormalities.  Recent lab works as noted in the HPI.  Lipid panel done 4/7/2025 showed an LDL cholesterol of 58 and triglycerides of 114 while on rosuvastatin 10 mg daily.   Lab works as noted below will be done in 6 months prior to his next visit.   Please see lifestyle recommendations below.  Follow up in 6 months and sooner if necessary.      2. Hypertension  The patient has a history of hypertension which appears controlled on exam today.  He should continue his current antihypertensive medications and monitor his blood pressure at home.      3. Dyslipidemia/statin intolerance  Lipid panel done 4/7/2025 showed an LDL cholesterol of 58 and triglycerides of 114 while on rosuvastatin 10 mg daily.   Please see lifestyle recommendations below.     4. Lower extremity edema  The patient has trace to 1+ pitting edema on the left leg and trace pitting edema on the right leg on exam today.  I discussed with him the importance of following a low-sodium heart healthy diet, wearing compression stockings and elevating legs when seated.     5. Preoperative cardiovascular examination  The patient is being evaluated for a dental procedure although it has not been scheduled yet. He does have a history of coronary artery disease status post PCI of his OM done in March 2023. He denies a history of heart  failure, ischemic cerebrovascular disease, insulin-dependent diabetes mellitus, or significant renal dysfunction.     RCRI of 1 based on history placing him at low risk of perioperative MACE.    I do not believe any additional preoperative testing is necessary at this time. Patient should continue his current cardiac medications perioperatively depending on the disposition of the surgical service.  Clopidogrel was stopped here in the office today.  Overall, the patient appears to be low risk for perioperative MACE. The patient expressed understanding of his risk for perioperative cardiovascular complications.          Orders:   Preoperative cardiovascular examination  Discontinue clopidogrel,   BMP/CBC/magnesium in 6 months,   Follow-up in 6 months.    Lifestyle Recommendations  I recommend a whole-food plant-based diet, an eating pattern that encourages the consumption of unrefined plant foods (such as fruits, vegetables, tubers, whole grains, legumes, nuts and seeds) and discourages meats, dairy products, eggs and processed foods.     The AHA/ACC recommends that the patient consume a dietary pattern that emphasizes intake of vegetables, fruits, and whole grains; includes low-fat dairy products, poultry, fish, legumes, non-tropical vegetable oils, and nuts; and limits intake of sodium, sweets, sugar-sweetened beverages, and red meats.  Adapt this dietary pattern to appropriate calorie requirements (a 500-750 kcal/day deficit to loose weight), personal and cultural food preferences, and nutrition therapy for other medical conditions (including diabetes).  Achieve this pattern by following plans such as the [place] Mediterranean, DASH dietary pattern, or AHA diet.     Engage in 2 hours and 30 minutes per week of moderate-intensity physical activity, or 1 hour and 15 minutes (75 minutes) per week of vigorous-intensity aerobic physical activity, or an equivalent combination of moderate and vigorous-intensity aerobic  physical activity. Aerobic activity should be performed in episodes of at least 10 minutes preferably spread throughout the week.     Adhering to a heart healthy diet, regular exercise habits, avoidance of tobacco products, and maintenance of a healthy weight are crucial components of their heart disease risk reduction.     Any positive review of systems not specifically addressed in the office visit today should be evaluated and treated by the patients primary care physician or in an emergency department if necessary     Patient was notified that results from ordered tests will be called to the patient if it changes current management; it will otherwise be discussed at a future appointment and available on  [employer] [facility].     Thank you for allowing me to participate in the care of this patient.        This document was generated using the assistance of voice recognition software. If there are any errors of spelling, grammar, syntax, or meaning; please feel free to contact me directly for clarification.    Past Medical History:  He has a past medical history of Arthropathy, unspecified, Hematospermia, Personal history of other diseases of male genital organs, Personal history of other specified conditions, Sinus congestion (01/31/2023), and Subjective fever (01/31/2023).    Past Surgical History:  He has a past surgical history that includes Hernia repair; Joint replacement; Hernia repair (05/18/2017); and Hip surgery (05/18/2017).      Social History:  He reports that he has never smoked. He has never used smokeless tobacco. He reports that he does not currently use alcohol. He reports that he does not use drugs.    Family History:  No family history on file.     Allergies:  Patient has no known allergies.    Outpatient Medications:  Current Outpatient Medications   Medication Instructions    acetaminophen (TYLENOL) 1,000 mg, Daily RT    allopurinol (Zyloprim) 300 mg tablet TAKE 1 TABLET BY MOUTH EVERY  OTHER DAY     "aspirin 81 mg, Daily    cholecalciferol (Vitamin D-3) 5,000 Units tablet 2 tablets, Daily    clopidogrel (PLAVIX) 75 mg, oral, Daily    fish oil concentrate (Omega-3) 120-180 mg capsule 1 g, 2 times daily    Lactobacillus acidoph-L.bulgar 1 million cell tablet tablet 1 tablet, Daily    magnesium oxide (Mag-Ox) 200 mg split tablet Take by mouth.    metoprolol succinate XL (TOPROL-XL) 25 mg, oral, Daily, DO NOT CRUSH OR CHEW    multivitamin tablet 1 tablet, Daily    plant stanol cindy 450 mg tablet Take by mouth.    rosuvastatin (CRESTOR) 10 mg, oral, Daily    tamsulosin (FLOMAX) 0.4 mg, oral, Daily    vitamin K2, MK-4, 100 mcg tablet 1 tablet, Daily    zinc sulfate 66 mg tablet Take by mouth.        ROS:  A 14 point review of systems was done and is negative other than as stated in HPI    Vitals:      4/5/2024     1:49 PM 4/7/2024    12:33 PM 6/11/2024     1:18 PM 10/10/2024     1:48 PM 11/21/2024     1:28 PM 12/16/2024     1:35 PM 4/9/2025     1:55 PM   Vitals   Systolic 118 161 120 144 183 130 138   Diastolic 78 70 82 76 74 72 80   BP Location   Left arm Left arm  Left arm    Heart Rate 65 70 65 58 72 70 60   Temp  37.2 °C (99 °F)        Resp 16 16        Height 1.702 m (5' 7\")  1.727 m (5' 8\")  1.727 m (5' 8\") 1.727 m (5' 8\") 1.727 m (5' 8\")   Weight (lb) 165.8  165  165 165 172.4   BMI 25.97 kg/m2  25.09 kg/m2  25.09 kg/m2 25.09 kg/m2 26.21 kg/m2   BSA (m2) 1.89 m2  1.89 m2  1.89 m2 1.89 m2 1.94 m2        Physical Exam:   Constitutional: Cooperative, in no acute distress, alert, appears stated age.   Skin: Skin color, texture, turgor normal. No rashes or lesions.   Head: Normocephalic. No masses, lesions, tenderness or abnormalities   Eyes: Extraocular movements are grossly intact.   Mouth and throat: Mucous membranes moist   Neck: Neck supple, no carotid bruits, no JVD   Respiratory: Lungs clear to auscultation, no wheezing or rhonchi, no use of accessory muscles   Chest wall: No scars, normal excursion with " respiration   Cardiovascular: Regular rhythm without murmur, gallop, or rubs   Gastrointestinal: Abdomen soft, nontender. Bowel sounds normal.   Musculoskeletal: Strength equal in upper extremities   Extremities: trace to 1+ pitting edema on the left leg and trace pitting edema on the right leg   Neurologic: Sensation grossly intact, alert and oriented x3        Intake/Output:   No intake/output data recorded.    Outpatient Medications  Current Outpatient Medications on File Prior to Visit   Medication Sig Dispense Refill    acetaminophen (Tylenol) 500 mg tablet Take 2 tablets (1,000 mg) by mouth once daily.      allopurinol (Zyloprim) 300 mg tablet TAKE 1 TABLET BY MOUTH EVERY  OTHER DAY 90 tablet 3    aspirin 81 mg EC tablet Take 1 tablet (81 mg) by mouth once daily.      cholecalciferol (Vitamin D-3) 5,000 Units tablet Take 2 tablets (250 mcg) by mouth once daily.      clopidogrel (Plavix) 75 mg tablet Take 1 tablet (75 mg) by mouth once daily. 90 tablet 1    fish oil concentrate (Omega-3) 120-180 mg capsule Take 1 capsule (1 g) by mouth twice a day.      Lactobacillus acidoph-L.bulgar 1 million cell tablet tablet Take 1 tablet by mouth once daily.      magnesium oxide (Mag-Ox) 200 mg split tablet Take by mouth.      metoprolol succinate XL (Toprol-XL) 25 mg 24 hr tablet Take 1 tablet (25 mg) by mouth once daily. DO NOT CRUSH OR CHEW 90 tablet 1    multivitamin tablet Take 1 tablet by mouth once daily.      plant stanol cindy 450 mg tablet Take by mouth.      rosuvastatin (Crestor) 10 mg tablet Take 1 tablet (10 mg) by mouth once daily. 90 tablet 1    tamsulosin (Flomax) 0.4 mg 24 hr capsule Take 1 capsule (0.4 mg) by mouth once daily. 90 capsule 3    vitamin K2, MK-4, 100 mcg tablet Take 1 tablet by mouth in the morning.      zinc sulfate 66 mg tablet Take by mouth.       No current facility-administered medications on file prior to visit.       Labs: (past 26 weeks)  Recent Results (from the past 26 weeks)    PSA    Collection Time: 11/18/24 11:18 AM   Result Value Ref Range    Prostate Specific AG 14.84 (H) <=4.00 ng/mL   Basic Metabolic Panel    Collection Time: 11/18/24 11:18 AM   Result Value Ref Range    Glucose 86 74 - 99 mg/dL    Sodium 140 136 - 145 mmol/L    Potassium 4.6 3.5 - 5.3 mmol/L    Chloride 104 98 - 107 mmol/L    Bicarbonate 29 21 - 32 mmol/L    Anion Gap 12 10 - 20 mmol/L    Urea Nitrogen 22 6 - 23 mg/dL    Creatinine 0.93 0.50 - 1.30 mg/dL    eGFR 82 >60 mL/min/1.73m*2    Calcium 9.7 8.6 - 10.3 mg/dL   CBC    Collection Time: 11/18/24 11:18 AM   Result Value Ref Range    WBC 7.0 4.4 - 11.3 x10*3/uL    nRBC 0.0 0.0 - 0.0 /100 WBCs    RBC 4.93 4.50 - 5.90 x10*6/uL    Hemoglobin 16.2 13.5 - 17.5 g/dL    Hematocrit 48.2 41.0 - 52.0 %    MCV 98 80 - 100 fL    MCH 32.9 26.0 - 34.0 pg    MCHC 33.6 32.0 - 36.0 g/dL    RDW 14.6 (H) 11.5 - 14.5 %    Platelets 181 150 - 450 x10*3/uL   Magnesium    Collection Time: 11/18/24 11:18 AM   Result Value Ref Range    Magnesium 2.33 1.60 - 2.40 mg/dL   POCT UA Automated manually resulted    Collection Time: 11/21/24  1:29 PM   Result Value Ref Range    POC Glucose, Urine NEGATIVE NEGATIVE mg/dl    POC Bilirubin, Urine NEGATIVE NEGATIVE    POC Ketones, Urine NEGATIVE NEGATIVE mg/dl    POC Specific Gravity, Urine 1.010 1.005 - 1.035    POC Blood, Urine NEGATIVE NEGATIVE    POC PH, Urine 7.0 No Reference Range Established PH    POC Protein, Urine NEGATIVE NEGATIVE, 30 (1+) mg/dl    POC Urobilinogen, Urine 0.2 0.2, 1.0 EU/DL    Poc Nitrite, Urine NEGATIVE NEGATIVE    POC Leukocytes, Urine NEGATIVE NEGATIVE   ECG 12 lead (Clinic Performed)    Collection Time: 12/16/24  1:30 PM   Result Value Ref Range    Ventricular Rate 70 BPM    Atrial Rate 70 BPM    SC Interval 192 ms    QRS Duration 90 ms    QT Interval 384 ms    QTC Calculation(Bazett) 414 ms    P Axis 44 degrees    R Axis 40 degrees    T Axis -12 degrees    QRS Count 11 beats    Q Onset 227 ms    P Onset 131 ms    P  Offset 175 ms    T Offset 419 ms    QTC Fredericia 404 ms   Lipid Panel    Collection Time: 04/07/25 11:37 AM   Result Value Ref Range    CHOLESTEROL, TOTAL 125 <200 mg/dL    HDL CHOLESTEROL 47 > OR = 40 mg/dL    TRIGLYCERIDES 114 <150 mg/dL    LDL-CHOLESTEROL 58 mg/dL (calc)    CHOL/HDLC RATIO 2.7 <5.0 (calc)    NON HDL CHOLESTEROL 78 <130 mg/dL (calc)   Comprehensive Metabolic Panel    Collection Time: 04/07/25 11:37 AM   Result Value Ref Range    GLUCOSE 91 65 - 99 mg/dL    UREA NITROGEN (BUN) 22 7 - 25 mg/dL    CREATININE 0.90 0.70 - 1.22 mg/dL    EGFR 85 > OR = 60 mL/min/1.73m2    SODIUM 141 135 - 146 mmol/L    POTASSIUM 4.2 3.5 - 5.3 mmol/L    CHLORIDE 105 98 - 110 mmol/L    CARBON DIOXIDE 27 20 - 32 mmol/L    ELECTROLYTE BALANCE 9 7 - 17 mmol/L (calc)    CALCIUM 9.7 8.6 - 10.3 mg/dL    PROTEIN, TOTAL 7.2 6.1 - 8.1 g/dL    ALBUMIN 4.7 3.6 - 5.1 g/dL    BILIRUBIN, TOTAL 0.7 0.2 - 1.2 mg/dL    ALKALINE PHOSPHATASE 49 35 - 144 U/L    AST 15 10 - 35 U/L    ALT 17 9 - 46 U/L   CBC    Collection Time: 04/07/25 11:37 AM   Result Value Ref Range    WHITE BLOOD CELL COUNT 6.4 3.8 - 10.8 Thousand/uL    RED BLOOD CELL COUNT 4.98 4.20 - 5.80 Million/uL    HEMOGLOBIN 16.0 13.2 - 17.1 g/dL    HEMATOCRIT 47.3 38.5 - 50.0 %    MCV 95.0 80.0 - 100.0 fL    MCH 32.1 27.0 - 33.0 pg    MCHC 33.8 32.0 - 36.0 g/dL    RDW 12.5 11.0 - 15.0 %    PLATELET COUNT 252 140 - 400 Thousand/uL    MPV 10.6 7.5 - 12.5 fL   Vitamin B12    Collection Time: 04/07/25 11:37 AM   Result Value Ref Range    VITAMIN B12 1,256 (H) 200 - 1,100 pg/mL   TSH with reflex to Free T4 if abnormal    Collection Time: 04/07/25 11:37 AM   Result Value Ref Range    TSH W/REFLEX TO FT4 2.23 0.40 - 4.50 mIU/L   Vitamin D 25-Hydroxy,Total (for eval of Vitamin D levels)    Collection Time: 04/07/25 11:37 AM   Result Value Ref Range    VITAMIN D,25-OH,TOTAL, (H) 30 - 100 ng/mL   Hemoglobin A1C    Collection Time: 04/07/25 11:37 AM   Result Value Ref Range     HEMOGLOBIN A1c 5.4 <5.7 % of total Hgb    eAG (mg/dL) 108 mg/dL    eAG (mmol/L) 6.0 mmol/L       ECG  Encounter Date: 12/16/24   ECG 12 lead (Clinic Performed)   Result Value    Ventricular Rate 70    Atrial Rate 70    LA Interval 192    QRS Duration 90    QT Interval 384    QTC Calculation(Bazett) 414    P Axis 44    R Axis 40    T Axis -12    QRS Count 11    Q Onset 227    P Onset 131    P Offset 175    T Offset 419    QTC Fredericia 404    Narrative    Sinus rhythm with occasional Premature ventricular complexes  Inferior infarct , age undetermined  Abnormal ECG  When compared with ECG of 01-MAR-2023 16:31,  PREVIOUS ECG IS PRESENT  Confirmed by Franco Luis ([id number]) on 12/16/2024 3:12:24 PM       Echocardiogram  No results found for this or any previous visit from the past 1095 days.      CV Studies:  EKG:  Encounter Date: 12/16/24   ECG 12 lead (Clinic Performed)   Result Value    Ventricular Rate 70    Atrial Rate 70    LA Interval 192    QRS Duration 90    QT Interval 384    QTC Calculation(Bazett) 414    P Axis 44    R Axis 40    T Axis -12    QRS Count 11    Q Onset 227    P Onset 131    P Offset 175    T Offset 419    QTC Fredericia 404    Narrative    Sinus rhythm with occasional Premature ventricular complexes  Inferior infarct , age undetermined  Abnormal ECG  When compared with ECG of 01-MAR-2023 16:31,  PREVIOUS ECG IS PRESENT  Confirmed by Franco Luis [id number]) on 12/16/2024 3:12:24 PM     Echocardiogram:   Echocardiogram     [place] [id number]  Phone [phone] Fax [phone]    TRANSTHORACIC ECHOCARDIOGRAM REPORT      Patient Name:     RITA Reyes Physician:   [id number] Franco Luis DO  Study Date:       2/28/2023           Referring Physician: REGINALDO PULIDO  MRN/PID:          [id number]            PCP:  Accession/Order#: [id number]           Department Location: [address]  YOB: 1945           Fellow:  Gender:            AMOR                   Nurse:  Admit Date:       2/27/2023           Sonographer:         Jill Pulido [facility]  Admission Status: Inpatient - Routine Additional Staff:  Height:           172.72 cm           CC Report to:  Weight:           79.83 kg            Study Type:          Echocardiogram  BSA:              1.94 m2  Blood Pressure: 157 /65 mmHg    Diagnosis/ICD: R07.89-Other chest pain  Indication:    Chest Pain  Procedure/CPT: Echo Complete w Full [id number]  Study Detail: The following Echo studies were performed: 2D, M-Mode, Doppler and  color flow.      PHYSICIAN INTERPRETATION:  Left Ventricle: Left ventricular systolic function is normal, with an estimated ejection fraction of 55-60%. There are no regional wall motion abnormalities. The left ventricular cavity size is normal. Spectral Doppler shows a normal pattern of left ventricular diastolic filling.  Left Atrium: The left atrium is normal in size.  Right Ventricle: The right ventricle is normal in size. There is normal right ventricular global systolic function.  Right Atrium: The right atrium is normal in size.  Aortic Valve: The aortic valve is trileaflet. There is trivial aortic valve regurgitation. The peak instantaneous gradient of the aortic valve is 10.9 mmHg. The mean gradient of the aortic valve is 5.0 mmHg.  Mitral Valve: The mitral valve is normal in structure. There is trace mitral valve regurgitation.  Tricuspid Valve: The tricuspid valve is structurally normal. There is trace tricuspid regurgitation.  Pulmonic Valve: The pulmonic valve is structurally normal. There is physiologic pulmonic valve regurgitation.  Pericardium: There is no pericardial effusion noted.  Aorta: The aortic root is normal.      CONCLUSIONS:  1. Left ventricular systolic function is normal with a 55-60% estimated ejection fraction.    QUANTITATIVE DATA SUMMARY:  2D MEASUREMENTS:  Normal Ranges:  Ao Root d:     2.80 cm   (2.0-3.7cm)  LAs:           4.30 cm    (2.7-4.0cm)  IVSd:          1.15 cm   (0.6-1.1cm)  LVPWd:         1.10 cm   (0.6-1.1cm)  LVIDd:         4.47 cm   (3.9-5.9cm)  LVIDs:         2.31 cm  LV Mass Index: 92.3 g/m2  LV % FS        48.3 %    LA VOLUME:  Normal Ranges:  LA Vol A4C:        32.1 ml    (22+/-6mL/m2)  LA Vol A2C:        34.0 ml  LA Vol BP:         34.4 ml  LA Vol Index A4C:  16.6ml/m2  LA Vol Index A2C:  17.6 ml/m2  LA Vol Index BP:   17.8 ml/m2  LA Area A4C:       13.6 cm2  LA Area A2C:       14.6 cm2  LA Major Axis A4C: 4.9 cm  LA Major Axis A2C: 5.3 cm  LA Volume Index:   16.6 ml/m2    RA VOLUME BY A/L METHOD:  Normal Ranges:  RA Area A4C: 7.9 cm2    M-MODE MEASUREMENTS:  Normal Ranges:  Ao Root: 2.80 cm (2.0-3.7cm)    AORTA MEASUREMENTS:  Normal Ranges:  Asc Ao, d: 3.00 cm (2.1-3.4cm)    LV SYSTOLIC FUNCTION BY 2D PLANIMETRY (MOD):  Normal Ranges:  EF-A4C View: 58.7 % (>=55%)  EF-A2C View: 55.5 %  EF-Biplane:  55.7 %    LV DIASTOLIC FUNCTION:  Normal Ranges:  MV Peak E:    0.53 m/s (0.7-1.2 m/s)  MV Peak A:    1.05 m/s (0.42-0.7 m/s)  E/A Ratio:    0.50     (1.0-2.2)  MV e'         0.10 m/s (>8.0)  MV lateral e' 0.12 m/s  MV medial e'  0.07 m/s  E/e' Ratio:   5.55     (<8.0)    MITRAL VALVE:  Normal Ranges:  MV DT: 411 msec (150-240msec)    AORTIC VALVE:  Normal Ranges:  AoV Vmax:                1.65 m/s  (<=1.7m/s)  AoV Peak PG:             10.9 mmHg (<20mmHg)  AoV Mean P.0 mmHg  (1.7-11.5mmHg)  LVOT Max Kyler:            0.95 m/s  (<=1.1m/s)  AoV VTI:                 31.30 cm  (18-25cm)  LVOT VTI:                19.10 cm  LVOT Diameter:           2.00 cm   (1.8-2.4cm)  AoV Area, VTI:           1.92 cm2  (2.5-5.5cm2)  AoV Area,Vmax:           1.81 cm2  (2.5-4.5cm2)  AoV Dimensionless Index: 0.61    RIGHT VENTRICLE:  RV 1   2.83 cm  RV 2   2.38 cm  RV 3   5.97 cm  TAPSE: 21.9 mm  RV s'  0.16 m/s    TRICUSPID VALVE/RVSP:  Normal Ranges:  TV E Vmax: 0.32 m/s (0.3-0.7m/s)  TV A Vmax: 0.33 m/s  IVC Diam:  1.87 cm      [id number] Franco  Toby DO  Electronically signed on 2/28/2023 at 12:11:47 PM         Final     Stress Testing IMELIE([id number]:1825): No results found for this or any previous visit from the past 1825 days.    Cardiac Catheterization:   Adult Cath     [facility], Cath Lab  [address] [id number]  Phone [phone] Fax [phone]    Cardiovascular Catheterization Report    Patient Name:     RITA HUBER Performing Physician: [id number] Maldonado MD  Study Date:       3/1/2023       Verifying Physician:  Lilly Maldonado MD  MRN/PID:          [id number]       Cardiologist:  Accession/Order#: [id number]      Referring Physician:  MARY PURCELL  YOB: 1945      Referring Physician:  Gender:           M              Referring Physician:      Study: Left Heart Catheterization      Indications:  RITA HUBER is a 78 year old male who presents with hypertension and dyslipidemia. New onset angina <=2 months, new onset angina <= 2 Months and other PCI indication - unstable angina , with a chest pain assessment of typical angina. Study performed as an urgent cath procedure.    Medical History:  Stress test performed: No. CTA performed: No. Agatston accessed: No. LVEF Assessed: Yes.    Procedure Description:  After infiltration with 2% Lidocaine, the right radial artery was cannulated with a modified Seldinger technique. Subsequently a 6 Dutch sheath was placed in the right radial artery. After infiltration with 2% Lidocaine, a second arterial access was obtained via the right femoral artery with a modified Seldinger technique and a 6 Dutch sheath was placed. This second arterial access was obtained to allow for subclavian artery tortuosity. Selective coronary catheterization was performed using a 6 Fr catheter(s) exchanged over a guide wire to cannulate the coronary arteries. A JL 4 tip catheter was used for left coronary injections. A JR 4 tip catheter was used for right  coronary injections.  Multiple injections of contrast were made into the left and right coronary arteries with angiograms recorded in multiple projections. After completion of the procedure, the arterial sheath was pulled and a TR Band Radial Compression Device was utilized to obtain patent hemostasis. Femoral artery angiography was performed at the additional arterial access site. This demonstrated a common femoral artery puncture appropriate for closure. An Angio-Seal Evolution 6F ([employer]) vascular closure device was placed per protocol.    Coronary Angiography:  The coronary circulation is co-dominant.    Left Main Coronary Artery:  The left main coronary artery is a normal caliber vessel. The left main arises normally from the left coronary sinus of Valsalva and bifurcates into the LAD and circumflex coronary arteries. The left main coronary artery showed no significant disease or stenosis greater than 30%.    Left Anterior Descending Coronary Artery Distribution:  The left anterior descending coronary artery is a normal caliber vessel. The LAD arises normally from the left main coronary artery and wraps around the apex of the LV. The LAD demonstrated diffuse mild atherosclerotic disease. The mid left anterior descending coronary artery showed ~ 30-40%. This lesion was mildly calcified. An additional lesion, located at the mid left anterior descending coronary artery, revealed 30% stenosis. This second lesion was calcified. The 1st diagonal branch is a normal caliber vessel. The 1st diagonal branch showed mild atherosclerotic disease.    Circumflex Coronary Artery Distribution:  The circumflex coronary artery is a large caliber vessel. The circumflex arises normally from the left main coronary artery, giving rise to the first obtuse marginal and supplies the left posterolateral descending artery segment. The circumflex revealed mild atherosclerotic disease. The 1st obtuse marginal branch is a large and  branching vessel caliber vessel. The mid 1st obtuse marginal branch showed 95-99%. The left posterior descending artery is a normal caliber vessel. The left posterior descending artery showed mild atherosclerotic disease.    Right Coronary Artery Distribution:    The right coronary artery is a medium-sized caliber vessel. The RCA arises normally from the right sinus of Valsalva and supplies the right posterolateral descending artery. The RCA showed diffuse mild atherosclerotic disease. The right posterior descending artery is a medium-sized caliber vessel. The right posterior descending artery showed mild atherosclerotic disease.    Coronary Interventions:  Angiography reveals a 99% stenosis of the mid 1st obtuse marginal and lower branch of OM1. Pre-intervention MASHA flow was 3. Percutaneous coronary intervention was performed within the mid 1st obtuse marginal and lower branch of OM1. The vessel was pre-dilated using a compliant balloon 2.0 mm x 15 mm at 12 STEPHAN. SYNERGY XD Everolimus drug-eluting stent 2.25 mm x 28 mm was advanced to the lesion and implanted at 12 STEPHAN. The stent was post dilated using a non-compliant balloon 2.5 mm x 15 mm at 16-20 STEPHAN. The stenosis was successfully reduced from 99% to 0%. Post-intervention MASHA flow was 3. Upfront antiplatelets were ASA and clopidogrel. Heparin was given IV to achieve an ACT > 300. 6Fr EBU 3.5 guide catheter was used to engage the LMCA. .014 BMW guidewire was placed into the LCx with distal tip in the upper branch of OM1. A .014 Runthrough wire was used to cross lesion with tip in the distal lower branch of OM1. The lesion was dilated with SC 2.0 x 15mm balloon at 12 stephan. A Synergy XD  2.25 x 28mm LU was deployed in the lower branch of OM1 at 12 stephan. The stent was postdilated with an NC 2.5mm balloon at 16-18 in the mid-distal portion, and at 20 stephan in the proximal portion. Good angioraphic result with MASHA flow in the vesse, no residual dissection or evidence  of distal embolization. Guidwires and guide were withdrawn, and the patient was chest pain free upon transfer to the ICU.          Complications:  No in-lab complications observed.    Cardiac Cath Transition of Care Summary:  Post Procedure           LU of Circumflex.  Diagnosis:  Blood Loss:              Estimated blood loss during the procedure was minimal  mls.  Specimens Removed:       Number of specimen(s) removed: for ACT's.      Recommendations:  Maximize medical therapy.  Agressive risk factor modification efforts.  Telemetry monitoring.  Follow-up with cardiology clinic.  Monitor vitals and arterial access site/pulses.  Anti-platelet therapy with Aspirin and Ticagrelor 90mgs BID.  Lipid lowering agent or Statin therapy.  Consider referral to cardiac rehabilitation.  Beta blocker therapy.  Angiotensin-converting enzyme (ACE) inhibitor for LV systolic dysfunction.    ____________________________________________________________________________________  CONCLUSIONS:  1. Unstable angina s/p successful PCI of lower branch of OM1 culprit lesion.  2. Mild-moderate LAD disease.  3. Mild diffuse RCA disease. Codominant system.    ____________________________________________________________________________________  CPT Codes:  Coronary Angiography S&I only ([facility])([facility])-45686; Revasc during AMI stent/angio/atherc,ins asp Thrombectomy, Left Circumflex single Vessel (PCI)-09243.LC; Moderate Sedation Services initial 15 minutes patient >5 years-21105; Moderate Sedation Services 1st additional 15 minutes patient >5 years-78303; Moderate Sedation Services 2nd additional 15 minutes patient >5 years-12957; Moderate Sedation Services 3rd additional 15 minutes patient >5 years-99631; Moderate Sedation Services 4th additional 15 minutes patient >5 years-78728    ICD 10 Codes:  I20.0-Unstable angina; R94.30-Abnormal result of cardiovascular function study, unspecified    [id number] Aric Maldonado MD  Performing Physician  Electronically  signed by [id number] Aric Maldonado MD on 3/2/2023 at 8:56:00 AM      cc Report to: FRANCO LUIS           Final   No results found for this or any previous visit from the past 3650 days.     Cardiac Scoring: No results found for this or any previous visit from the past 1825 days.    AAA : No results found for this or any previous visit from the past 1825 days.    OTHER: No results found for this or any previous visit from the past 1825 days.    LAST IMAGING RESULTS  ECG 12 lead (Clinic Performed)  Sinus rhythm with occasional Premature ventricular complexes  Inferior infarct , age undetermined  Abnormal ECG  When compared with ECG of 01-MAR-2023 16:31,  PREVIOUS ECG IS PRESENT  Confirmed by Franco Luis ([id number]) on 12/16/2024 3:12:24 PM      Problem List Items Addressed This Visit       Dyslipidemia    Hypertension    CAD (coronary artery disease) - Primary    Statin intolerance    Preoperative cardiovascular examination          Franco Luis DO, FACC, FACOI

## 2025-04-23 ENCOUNTER — OFFICE VISIT (OUTPATIENT)
Dept: CARDIOLOGY | Facility: HOSPITAL | Age: 80
End: 2025-04-23
Payer: MEDICARE

## 2025-04-23 VITALS
HEIGHT: 68 IN | DIASTOLIC BLOOD PRESSURE: 74 MMHG | WEIGHT: 172 LBS | SYSTOLIC BLOOD PRESSURE: 126 MMHG | BODY MASS INDEX: 26.07 KG/M2 | HEART RATE: 62 BPM

## 2025-04-23 DIAGNOSIS — I25.10 CORONARY ARTERY DISEASE INVOLVING NATIVE CORONARY ARTERY OF NATIVE HEART WITHOUT ANGINA PECTORIS: Primary | ICD-10-CM

## 2025-04-23 DIAGNOSIS — E78.5 DYSLIPIDEMIA: ICD-10-CM

## 2025-04-23 DIAGNOSIS — Z78.9 STATIN INTOLERANCE: ICD-10-CM

## 2025-04-23 DIAGNOSIS — I10 PRIMARY HYPERTENSION: ICD-10-CM

## 2025-04-23 DIAGNOSIS — Z01.810 PREOPERATIVE CARDIOVASCULAR EXAMINATION: ICD-10-CM

## 2025-04-23 LAB
ATRIAL RATE: 62 BPM
P AXIS: 35 DEGREES
P OFFSET: 158 MS
P ONSET: 113 MS
PR INTERVAL: 210 MS
Q ONSET: 218 MS
QRS COUNT: 10 BEATS
QRS DURATION: 96 MS
QT INTERVAL: 428 MS
QTC CALCULATION(BAZETT): 434 MS
QTC FREDERICIA: 432 MS
R AXIS: -11 DEGREES
T AXIS: 22 DEGREES
T OFFSET: 432 MS
VENTRICULAR RATE: 62 BPM

## 2025-04-23 PROCEDURE — 1157F ADVNC CARE PLAN IN RCRD: CPT | Performed by: INTERNAL MEDICINE

## 2025-04-23 PROCEDURE — 1159F MED LIST DOCD IN RCRD: CPT | Performed by: INTERNAL MEDICINE

## 2025-04-23 PROCEDURE — 99214 OFFICE O/P EST MOD 30 MIN: CPT | Mod: 25 | Performed by: INTERNAL MEDICINE

## 2025-04-23 PROCEDURE — 93005 ELECTROCARDIOGRAM TRACING: CPT | Performed by: INTERNAL MEDICINE

## 2025-04-23 PROCEDURE — 3078F DIAST BP <80 MM HG: CPT | Performed by: INTERNAL MEDICINE

## 2025-04-23 PROCEDURE — 1036F TOBACCO NON-USER: CPT | Performed by: INTERNAL MEDICINE

## 2025-04-23 PROCEDURE — 99214 OFFICE O/P EST MOD 30 MIN: CPT | Performed by: INTERNAL MEDICINE

## 2025-04-23 PROCEDURE — 3074F SYST BP LT 130 MM HG: CPT | Performed by: INTERNAL MEDICINE

## 2025-04-23 RX ORDER — ROSUVASTATIN CALCIUM 10 MG/1
10 TABLET, COATED ORAL DAILY
Qty: 90 TABLET | Refills: 3 | Status: SHIPPED | OUTPATIENT
Start: 2025-04-23

## 2025-06-18 ENCOUNTER — APPOINTMENT (OUTPATIENT)
Dept: CARDIOLOGY | Facility: HOSPITAL | Age: 80
End: 2025-06-18
Payer: MEDICARE

## 2025-06-25 ENCOUNTER — APPOINTMENT (OUTPATIENT)
Dept: CARDIOLOGY | Facility: HOSPITAL | Age: 80
End: 2025-06-25
Payer: MEDICARE

## 2025-07-02 ENCOUNTER — TELEPHONE (OUTPATIENT)
Dept: UROLOGY | Facility: CLINIC | Age: 80
End: 2025-07-02
Payer: MEDICARE

## 2025-07-02 DIAGNOSIS — N40.0 BENIGN PROSTATIC HYPERPLASIA WITHOUT LOWER URINARY TRACT SYMPTOMS: ICD-10-CM

## 2025-07-02 RX ORDER — TAMSULOSIN HYDROCHLORIDE 0.4 MG/1
0.4 CAPSULE ORAL DAILY
Qty: 90 CAPSULE | Refills: 3 | Status: SHIPPED | OUTPATIENT
Start: 2025-07-02

## 2025-07-02 NOTE — TELEPHONE ENCOUNTER
Jose Rubio [birth year] refill please on Flomax 0.4mg   pharmacy is the mail order [facility], Thank you

## 2025-08-26 DIAGNOSIS — I10 PRIMARY HYPERTENSION: ICD-10-CM

## 2025-08-26 DIAGNOSIS — I25.10 CORONARY ARTERY DISEASE INVOLVING NATIVE CORONARY ARTERY OF NATIVE HEART WITHOUT ANGINA PECTORIS: ICD-10-CM

## 2025-08-27 RX ORDER — METOPROLOL SUCCINATE 25 MG/1
25 TABLET, EXTENDED RELEASE ORAL DAILY
Qty: 90 TABLET | Refills: 3 | Status: SHIPPED | OUTPATIENT
Start: 2025-08-27

## 2025-10-09 ENCOUNTER — APPOINTMENT (OUTPATIENT)
Dept: PRIMARY CARE | Facility: CLINIC | Age: 80
End: 2025-10-09
Payer: MEDICARE

## 2025-11-21 ENCOUNTER — APPOINTMENT (OUTPATIENT)
Dept: UROLOGY | Facility: CLINIC | Age: 80
End: 2025-11-21
Payer: MEDICARE

## 2026-04-09 ENCOUNTER — APPOINTMENT (OUTPATIENT)
Dept: PRIMARY CARE | Facility: CLINIC | Age: 81
End: 2026-04-09
Payer: MEDICARE